# Patient Record
Sex: FEMALE | Race: WHITE | URBAN - METROPOLITAN AREA
[De-identification: names, ages, dates, MRNs, and addresses within clinical notes are randomized per-mention and may not be internally consistent; named-entity substitution may affect disease eponyms.]

---

## 2024-01-18 ENCOUNTER — OFFICE VISIT (OUTPATIENT)
Dept: OBGYN CLINIC | Facility: CLINIC | Age: 19
End: 2024-01-18

## 2024-01-18 VITALS — WEIGHT: 229 LBS | SYSTOLIC BLOOD PRESSURE: 126 MMHG | HEART RATE: 77 BPM | DIASTOLIC BLOOD PRESSURE: 58 MMHG

## 2024-01-18 DIAGNOSIS — Z30.09 UNWANTED FERTILITY: Primary | ICD-10-CM

## 2024-01-18 PROCEDURE — 99203 OFFICE O/P NEW LOW 30 MIN: CPT | Performed by: NURSE PRACTITIONER

## 2024-01-18 RX ORDER — NORGESTIMATE AND ETHINYL ESTRADIOL 0.25-0.035
1 KIT ORAL DAILY
Qty: 28 TABLET | Refills: 3 | Status: SHIPPED | OUTPATIENT
Start: 2024-01-18

## 2024-01-18 NOTE — PATIENT INSTRUCTIONS
Thank you for your confidence in our team.   We appreciate you and welcome your feedback.   If you receive a survey from us, please take a few moments to let us know how we are doing.   Sincerely,  HUSSAIN Frost

## 2024-01-18 NOTE — PROGRESS NOTES
PROBLEM GYNECOLOGICAL VISIT    Renae Simental is a 18 y.o. female who presents today with complaint of unwanted fertility.  Her general medical history has been reviewed and she reports it as follows:    Past Medical History:   Diagnosis Date    No known health problems      Past Surgical History:   Procedure Laterality Date    NO PAST SURGERIES       OB History          0    Para   0    Term   0       0    AB   0    Living   0         SAB   0    IAB   0    Ectopic   0    Multiple   0    Live Births   0               Social History     Tobacco Use    Smoking status: Never    Smokeless tobacco: Never   Vaping Use    Vaping status: Never Used   Substance Use Topics    Alcohol use: Yes     Comment: couple times/year    Drug use: Never     Social History     Substance and Sexual Activity   Sexual Activity Yes    Partners: Male    Birth control/protection: Condom Male       No current outpatient medications    History of Present Illness:   Reports she is sexually active and consistently using condoms.  Desires to initiate contraception with OCP's.  She has never used contraception other than condoms previously.  Reports menses are regular and without issue.  Menses started yesterday.    Review of Systems:  Review of Systems   Constitutional: Negative.    Gastrointestinal: Negative.    Genitourinary: Negative.        Physical Exam:  /58   Pulse 77   Wt 104 kg (229 lb)   LMP 2024 (Exact Date)   Physical Exam  Constitutional:       General: She is not in acute distress.  Neurological:      Mental Status: She is alert.   Skin:     General: Skin is warm and dry.   Vitals reviewed.       Assessment:   1. Unwanted fertility.    Plan:   1. Given Rx OCP's and instructed on appropriate administration.   2. Return to office in 3 months for surveillance visit.   3. Patient's depression screening was assessed with a PHQ-2 score of 0. Their PHQ-9 score was 0. Clinically patient does not have  depression. No treatment is required.

## 2024-02-17 ENCOUNTER — APPOINTMENT (EMERGENCY)
Dept: ULTRASOUND IMAGING | Facility: HOSPITAL | Age: 19
End: 2024-02-17

## 2024-02-17 ENCOUNTER — HOSPITAL ENCOUNTER (EMERGENCY)
Facility: HOSPITAL | Age: 19
Discharge: HOME | End: 2024-02-18
Attending: EMERGENCY MEDICINE

## 2024-02-17 DIAGNOSIS — Z34.90 PREGNANCY: Primary | ICD-10-CM

## 2024-02-17 LAB
ALBUMIN SERPL BCP-MCNC: 4.1 G/DL (ref 3.5–5)
ALP SERPL-CCNC: 38 U/L (ref 34–104)
ALT SERPL W P-5'-P-CCNC: 16 U/L (ref 7–52)
ANION GAP SERPL CALCULATED.3IONS-SCNC: 10 MMOL/L
AST SERPL W P-5'-P-CCNC: 14 U/L (ref 13–39)
B-HCG SERPL-ACNC: 667 MIU/ML (ref 0–5)
BASOPHILS # BLD AUTO: 0.04 THOUSANDS/ÂΜL (ref 0–0.1)
BASOPHILS NFR BLD AUTO: 1 % (ref 0–1)
BILIRUB SERPL-MCNC: 0.56 MG/DL (ref 0.2–1)
BUN SERPL-MCNC: 13 MG/DL (ref 5–25)
CALCIUM SERPL-MCNC: 8.8 MG/DL (ref 8.4–10.2)
CHLORIDE SERPL-SCNC: 106 MMOL/L (ref 96–108)
CO2 SERPL-SCNC: 20 MMOL/L (ref 21–32)
CREAT SERPL-MCNC: 0.69 MG/DL (ref 0.6–1.3)
EOSINOPHIL # BLD AUTO: 0.08 THOUSAND/ÂΜL (ref 0–0.61)
EOSINOPHIL NFR BLD AUTO: 1 % (ref 0–6)
ERYTHROCYTE [DISTWIDTH] IN BLOOD BY AUTOMATED COUNT: 19.6 % (ref 11.6–15.1)
GFR SERPL CREATININE-BSD FRML MDRD: 125 ML/MIN/1.73SQ M
GLUCOSE SERPL-MCNC: 94 MG/DL (ref 65–140)
HCT VFR BLD AUTO: 32.6 % (ref 34.8–46.1)
HGB BLD-MCNC: 9.5 G/DL (ref 11.5–15.4)
IMM GRANULOCYTES # BLD AUTO: 0.03 THOUSAND/UL (ref 0–0.2)
IMM GRANULOCYTES NFR BLD AUTO: 0 % (ref 0–2)
LYMPHOCYTES # BLD AUTO: 2.7 THOUSANDS/ÂΜL (ref 0.6–4.47)
LYMPHOCYTES NFR BLD AUTO: 32 % (ref 14–44)
MCH RBC QN AUTO: 21 PG (ref 26.8–34.3)
MCHC RBC AUTO-ENTMCNC: 29.1 G/DL (ref 31.4–37.4)
MCV RBC AUTO: 72 FL (ref 82–98)
MONOCYTES # BLD AUTO: 0.4 THOUSAND/ÂΜL (ref 0.17–1.22)
MONOCYTES NFR BLD AUTO: 5 % (ref 4–12)
NEUTROPHILS # BLD AUTO: 5.28 THOUSANDS/ÂΜL (ref 1.85–7.62)
NEUTS SEG NFR BLD AUTO: 61 % (ref 43–75)
NRBC BLD AUTO-RTO: 0 /100 WBCS
PLATELET # BLD AUTO: 264 THOUSANDS/UL (ref 149–390)
PMV BLD AUTO: 11.2 FL (ref 8.9–12.7)
POTASSIUM SERPL-SCNC: 3.8 MMOL/L (ref 3.5–5.3)
PROT SERPL-MCNC: 7.4 G/DL (ref 6.4–8.4)
RBC # BLD AUTO: 4.52 MILLION/UL (ref 3.81–5.12)
SODIUM SERPL-SCNC: 136 MMOL/L (ref 135–147)
WBC # BLD AUTO: 8.53 THOUSAND/UL (ref 4.31–10.16)

## 2024-02-17 PROCEDURE — 80053 COMPREHEN METABOLIC PANEL: CPT | Performed by: EMERGENCY MEDICINE

## 2024-02-17 PROCEDURE — 99284 EMERGENCY DEPT VISIT MOD MDM: CPT

## 2024-02-17 PROCEDURE — 76815 OB US LIMITED FETUS(S): CPT

## 2024-02-17 PROCEDURE — 86901 BLOOD TYPING SEROLOGIC RH(D): CPT | Performed by: EMERGENCY MEDICINE

## 2024-02-17 PROCEDURE — 96361 HYDRATE IV INFUSION ADD-ON: CPT

## 2024-02-17 PROCEDURE — 96360 HYDRATION IV INFUSION INIT: CPT

## 2024-02-17 PROCEDURE — 36415 COLL VENOUS BLD VENIPUNCTURE: CPT | Performed by: EMERGENCY MEDICINE

## 2024-02-17 PROCEDURE — 86900 BLOOD TYPING SEROLOGIC ABO: CPT | Performed by: EMERGENCY MEDICINE

## 2024-02-17 PROCEDURE — 84702 CHORIONIC GONADOTROPIN TEST: CPT | Performed by: EMERGENCY MEDICINE

## 2024-02-17 PROCEDURE — 85025 COMPLETE CBC W/AUTO DIFF WBC: CPT | Performed by: EMERGENCY MEDICINE

## 2024-02-17 PROCEDURE — 99284 EMERGENCY DEPT VISIT MOD MDM: CPT | Performed by: EMERGENCY MEDICINE

## 2024-02-17 RX ORDER — ACETAMINOPHEN 325 MG/1
975 TABLET ORAL ONCE
Status: COMPLETED | OUTPATIENT
Start: 2024-02-17 | End: 2024-02-17

## 2024-02-17 RX ADMIN — SODIUM CHLORIDE 1000 ML: 0.9 INJECTION, SOLUTION INTRAVENOUS at 22:41

## 2024-02-17 RX ADMIN — ACETAMINOPHEN 975 MG: 325 TABLET, FILM COATED ORAL at 22:41

## 2024-02-18 VITALS
RESPIRATION RATE: 16 BRPM | TEMPERATURE: 98.6 F | OXYGEN SATURATION: 100 % | DIASTOLIC BLOOD PRESSURE: 55 MMHG | SYSTOLIC BLOOD PRESSURE: 117 MMHG | HEART RATE: 103 BPM

## 2024-02-18 LAB
ABO GROUP BLD: NORMAL
BILIRUB UR QL STRIP: NEGATIVE
CLARITY UR: CLEAR
COLOR UR: ABNORMAL
GLUCOSE UR STRIP-MCNC: NEGATIVE MG/DL
HGB UR QL STRIP.AUTO: NEGATIVE
KETONES UR STRIP-MCNC: ABNORMAL MG/DL
LEUKOCYTE ESTERASE UR QL STRIP: NEGATIVE
NITRITE UR QL STRIP: NEGATIVE
PH UR STRIP.AUTO: 5.5 [PH]
PROT UR STRIP-MCNC: NEGATIVE MG/DL
RH BLD: POSITIVE
SP GR UR STRIP.AUTO: 1.02 (ref 1–1.03)
UROBILINOGEN UR STRIP-ACNC: <2 MG/DL

## 2024-02-18 PROCEDURE — 87086 URINE CULTURE/COLONY COUNT: CPT | Performed by: EMERGENCY MEDICINE

## 2024-02-18 PROCEDURE — 81003 URINALYSIS AUTO W/O SCOPE: CPT | Performed by: EMERGENCY MEDICINE

## 2024-02-18 RX ORDER — PRENATAL VIT/IRON FUM/FOLIC AC 27MG-0.8MG
1 TABLET ORAL DAILY
Qty: 14 TABLET | Refills: 0 | Status: SHIPPED | OUTPATIENT
Start: 2024-02-18 | End: 2024-03-03

## 2024-02-18 NOTE — DISCHARGE INSTRUCTIONS
Your hCG (pregnancy hormone) is 667.  An ultrasound was performed today however was unable to identify an intrauterine pregnancy.  Given your recent menstrual period as well as your pregnancy hormone it is likely that your pregnancy is very early.  An ambulatory referral to obstetrics has been placed for you.  Please follow-up for repeat blood work as well as a confirmatory pregnancy.  Please return to the emergency department if you develop any fever, chills, worsening abdominal pain despite Tylenol, vaginal bleeding, or for any other concerns.

## 2024-02-18 NOTE — ED PROVIDER NOTES
History  Chief Complaint   Patient presents with    Abdominal Pain     Possible pregnancy- pt reports possible positive pregnancy test yesterday at home. Lower abdominal pain that started today. Denies n/v/d.      (Renae Simental) Renae Simental is a 18 y.o. female  at Unknown    They presented to the emergency department on 2024 for the chief complaint of: Patient presents with:  Abdominal Pain: Possible pregnancy- pt reports possible positive pregnancy test yesterday at home. Lower abdominal pain that started today. Denies n/v/d.       The patient states that she last had her menstrual period on  of this year, and was told by her boyfriend that she needed to take a pregnancy test yesterday because she was due and it was noted to be positive.  Patient has also been experiencing mild left lower quadrant abdominal discomfort.  Patient denies any nausea, vomiting, diarrhea, vaginal bleeding.  Patient states that she possibly has a history of kidney stones, however is unable to recall definitive diagnosis. Patient denies fever, chills, back pain, or any other complaint at this time.                Prior to Admission Medications   Prescriptions Last Dose Informant Patient Reported? Taking?   norgestimate-ethinyl estradiol (Sprintec 28) 0.25-35 MG-MCG per tablet   No No   Sig: Take 1 tablet by mouth daily      Facility-Administered Medications: None       Past Medical History:   Diagnosis Date    No known health problems        Past Surgical History:   Procedure Laterality Date    NO PAST SURGERIES         Family History   Problem Relation Age of Onset    Breast cancer Neg Hx     Colon cancer Neg Hx     Ovarian cancer Neg Hx     Cancer Neg Hx      I have reviewed and agree with the history as documented.    E-Cigarette/Vaping    E-Cigarette Use Never User      E-Cigarette/Vaping Substances     Social History     Tobacco Use    Smoking status: Never    Smokeless tobacco: Never    Vaping Use    Vaping status: Never Used   Substance Use Topics    Alcohol use: Yes     Comment: couple times/year    Drug use: Never        Review of Systems   Constitutional:  Negative for chills and fever.   HENT:  Negative for ear pain and sore throat.    Eyes:  Negative for pain and visual disturbance.   Respiratory:  Negative for cough and shortness of breath.    Cardiovascular:  Negative for chest pain and palpitations.   Gastrointestinal:  Positive for abdominal pain. Negative for constipation, diarrhea, nausea and vomiting.   Genitourinary:  Negative for dysuria and hematuria.   Musculoskeletal:  Negative for arthralgias and back pain.   Skin:  Negative for color change and rash.   Neurological:  Negative for seizures and syncope.   All other systems reviewed and are negative.      Physical Exam  ED Triage Vitals   Temperature Pulse Respirations Blood Pressure SpO2   02/17/24 2147 02/17/24 2147 02/17/24 2147 02/17/24 2147 02/17/24 2147   98.6 °F (37 °C) 102 16 127/79 100 %      Temp Source Heart Rate Source Patient Position - Orthostatic VS BP Location FiO2 (%)   02/17/24 2147 02/17/24 2147 02/17/24 2147 02/17/24 2147 --   Oral Monitor Sitting Right arm       Pain Score       02/17/24 2241       6             Orthostatic Vital Signs  Vitals:    02/17/24 2147 02/18/24 0120   BP: 127/79 117/55   Pulse: 102 103   Patient Position - Orthostatic VS: Sitting Standing       Physical Exam  Vitals and nursing note reviewed.   Constitutional:       General: She is not in acute distress.     Appearance: Normal appearance.   HENT:      Head: Normocephalic and atraumatic.      Right Ear: External ear normal.      Left Ear: External ear normal.      Nose: Nose normal.      Mouth/Throat:      Mouth: Mucous membranes are moist.   Eyes:      Conjunctiva/sclera: Conjunctivae normal.   Cardiovascular:      Rate and Rhythm: Normal rate and regular rhythm.   Pulmonary:      Effort: Pulmonary effort is normal. No respiratory  distress.      Breath sounds: Normal breath sounds.   Abdominal:      General: Abdomen is flat. Bowel sounds are normal.      Tenderness: There is no abdominal tenderness. There is no guarding or rebound.   Musculoskeletal:         General: Normal range of motion.      Cervical back: Normal range of motion.   Skin:     General: Skin is warm and dry.      Capillary Refill: Capillary refill takes less than 2 seconds.   Neurological:      Mental Status: She is alert. Mental status is at baseline.   Psychiatric:         Mood and Affect: Mood normal.         ED Medications  Medications   acetaminophen (TYLENOL) tablet 975 mg (975 mg Oral Given 2/17/24 2241)   sodium chloride 0.9 % bolus 1,000 mL (0 mL Intravenous Stopped 2/18/24 0110)       Diagnostic Studies  Results Reviewed       Procedure Component Value Units Date/Time    UA w Reflex to Microscopic w Reflex to Culture [[id number]]  (Abnormal) Collected: 02/18/24 0134    Lab Status: Final result Specimen: Urine, Clean Catch Updated: 02/18/24 0154     Color, UA Light Yellow     Clarity, UA Clear     Specific Gravity, UA 1.025     pH, UA 5.5     Leukocytes, UA Negative     Nitrite, UA Negative     Protein, UA Negative mg/dl      Glucose, UA Negative mg/dl      Ketones, UA Trace mg/dl      Urobilinogen, UA <2.0 mg/dl      Bilirubin, UA Negative     Occult Blood, UA Negative     URINE COMMENT --    Urine culture [[id number]] Collected: 02/18/24 0134    Lab Status: In process Specimen: Urine, Clean Catch Updated: 02/18/24 0154    hCG, quantitative [[id number]]  (Abnormal) Collected: 02/17/24 2239    Lab Status: Final result Specimen: Blood from Arm, Right Updated: 02/17/24 2316     HCG, Quant 667 mIU/mL     Narrative:       Expected Ranges:    HCG results between 5 and 25 mIU/mL may be indicative of early pregnancy but should be interpreted in light of the total clinical presentation.    HCG can rise to detectable levels in antione and post menopausal women (0-11.6 mIU/mL).      Approximate               Approximate HCG  Gestation age          Concentration ( mIU/mL)  _____________          ______________________   Weeks                      HCG values  0.2-1                       5-50  1-2                           2-3                         100-5000  3-4                         500-65532  4-5                         1000-39494  5-6                         [id number]  6-8                         72869-628294  8-12                        [id number]      Comprehensive metabolic panel [[id number]]  (Abnormal) Collected: 02/17/24 2239    Lab Status: Final result Specimen: Blood from Arm, Right Updated: 02/17/24 2309     Sodium 136 mmol/L      Potassium 3.8 mmol/L      Chloride 106 mmol/L      CO2 20 mmol/L      ANION GAP 10 mmol/L      BUN 13 mg/dL      Creatinine 0.69 mg/dL      Glucose 94 mg/dL      Calcium 8.8 mg/dL      AST 14 U/L      ALT 16 U/L      Alkaline Phosphatase 38 U/L      Total Protein 7.4 g/dL      Albumin 4.1 g/dL      Total Bilirubin 0.56 mg/dL      eGFR 125 ml/min/1.73sq m     Narrative:      National Kidney Disease Foundation guidelines for Chronic Kidney Disease (CKD):     Stage 1 with normal or high GFR (GFR > 90 mL/min/1.73 square meters)    Stage 2 Mild CKD (GFR = 60-89 mL/min/1.73 square meters)    Stage 3A Moderate CKD (GFR = 45-59 mL/min/1.73 square meters)    Stage 3B Moderate CKD (GFR = 30-44 mL/min/1.73 square meters)    Stage 4 Severe CKD (GFR = 15-29 mL/min/1.73 square meters)    Stage 5 End Stage CKD (GFR <15 mL/min/1.73 square meters)  Note: GFR calculation is accurate only with a steady state creatinine    CBC and differential [[id number]]  (Abnormal) Collected: 02/17/24 2239    Lab Status: Final result Specimen: Blood from Arm, Right Updated: 02/17/24 2258     WBC 8.53 Thousand/uL      RBC 4.52 Million/uL      Hemoglobin 9.5 g/dL      Hematocrit 32.6 %      MCV 72 fL      MCH 21.0 pg      MCHC 29.1 g/dL      RDW 19.6 %      MPV 11.2 fL       Platelets 264 Thousands/uL      nRBC 0 /100 WBCs      Neutrophils Relative 61 %      Immat GRANS % 0 %      Lymphocytes Relative 32 %      Monocytes Relative 5 %      Eosinophils Relative 1 %      Basophils Relative 1 %      Neutrophils Absolute 5.28 Thousands/µL      Immature Grans Absolute 0.03 Thousand/uL      Lymphocytes Absolute 2.70 Thousands/µL      Monocytes Absolute 0.40 Thousand/µL      Eosinophils Absolute 0.08 Thousand/µL      Basophils Absolute 0.04 Thousands/µL                    US OB pregnancy limited with transvaginal   Final Result by Jonn Fenton DO (50)      No intrauterine gestation is seen.      Differential considerations remain early IUP, spontaneous  and/or ectopic pregnancy. Clinical correlation and correlation with serial quantitative BHCG measurements recommended.      Probable right ovarian corpus luteum, bilateral ovaries otherwise appear grossly unremarkable.      Other findings as above.            Workstation performed: [id number]               Procedures  Procedures      ED Course  ED Course as of 24 0241   Sat 2024   2300 Hemoglobin(!): 9.5  No previous to compare to   2316 HCG QUANTITATIVE(!): 667  Discussed with patient.  Will obtain ultrasound to rule out ectopic.                                       Medical Decision Making  18-year-old female last menstrual period on 2024 now with complaints of mild left lower quadrant pain without tenderness on examination.  Differential diagnosis includes pregnancy, ectopic, nephrolithiasis.  Will obtain blood work at this time to evaluate for quantitative hCG, Rh status, should it be positive will obtain ultrasound to rule out possible ectopic.  Will also obtain urinalysis to evaluate for urinary tract infection, as well as evidence of hematuria for possible evaluation of nephrolithiasis.  Will provide IV fluid as well as Tylenol for symptomatic control.     Laboratory analysis showed positive  hCG of 667.  Ultrasound ordered which did not show IUP, but no other signs of ectopic.  Urinalysis showing mild ketones without signs of infection.  Patient feeling much improved after administration of IV fluids as well as Tylenol.  Discussed these findings with patient.  Ambulatory referral to obstetrics placed.  Prescription for prenatal vitamins provided.  Discussed that patient would need repeat blood work as well as confirmatory ultrasound.  Strict return precautions discussed. Patient appears well, nontoxic, agrees with plan of care at this time.  Answered all questions.  In light of this, patient would benefit from outpatient follow-up.    Amount and/or Complexity of Data Reviewed  Labs: ordered. Decision-making details documented in ED Course.  Radiology: ordered.    Risk  OTC drugs.          Disposition  Final diagnoses:   Pregnancy     Time reflects when diagnosis was documented in both MDM as applicable and the Disposition within this note       Time User Action Codes Description Comment    2/18/2024 12:58 AM Eleazar Medeiros Add [Z34.90] Pregnancy           ED Disposition       ED Disposition   Discharge    Condition   Stable    Date/Time   Sun Feb 18, 2024 12:59 AM    Comment   Renae Simental discharge to home/self care.                   Follow-up Information    None         Discharge Medication List as of 2/18/2024  1:01 AM        START taking these medications    Details   prenatal vitamin (CLASSIC FORMULA) 27-0.8 mg Take 1 tablet by mouth in the morning for 14 days, Starting Sun 2/18/2024, Until Sun 3/3/2024, Normal           CONTINUE these medications which have NOT CHANGED    Details   norgestimate-ethinyl estradiol (Sprintec 28) 0.25-35 MG-MCG per tablet Take 1 tablet by mouth daily, Starting Thu 1/18/2024, Normal               PDMP Review       None             ED Provider  Attending physically available and evaluated Renae Simental. I managed the patient along with the ED  Attending.    Electronically Signed by           Eleazar Medeiros MD  02/18/24 9286

## 2024-02-18 NOTE — ED ATTENDING ATTESTATION
2/17/2024  IMaren DO, saw and evaluated the patient. I have discussed the patient with the resident/non-physician practitioner and agree with the resident's/non-physician practitioner's findings, Plan of Care, and MDM as documented in the resident's/non-physician practitioner's note, except where noted. All available labs and Radiology studies were reviewed.  I was present for key portions of any procedure(s) performed by the resident/non-physician practitioner and I was immediately available to provide assistance.       At this point I agree with the current assessment done in the Emergency Department.  I have conducted an independent evaluation of this patient a history and physical is as follows:    History  Patient is a 18 y.o. year old female who presents for evaluation with abdominal pain.  Patient reports left lower quadrant abdominal pain which started earlier today.  Patient states that her last menstrual period was in mid January, and she recently took a home pregnancy test which was positive.  She denies any associated nausea, vomiting, diarrhea, vaginal bleeding, dysuria, or other concerning symptoms.    Current Outpatient Medications   Medication Instructions    norgestimate-ethinyl estradiol (Sprintec 28) 0.25-35 MG-MCG per tablet 1 tablet, Oral, Daily    prenatal vitamin (CLASSIC FORMULA) 27-0.8 mg 1 tablet, Oral, Daily     Past Medical History:   Diagnosis Date    No known health problems      Past Surgical History:   Procedure Laterality Date    NO PAST SURGERIES         Objective  Vitals:    02/17/24 2147 02/18/24 0120   BP: 127/79 117/55   BP Location: Right arm Right arm   Pulse: 102 103   Resp: 16 16   Temp: 98.6 °F (37 °C)    TempSrc: Oral    SpO2: 100% 100%       General: VSS, NAD, awake, alert.    Head: Normocephalic, atraumatic.  Eyes: PERRL, EOM-I.   ENT: Atraumatic external nose and ears.  MMM. No stridor.   Neck: Symmetric, supple, trachea midline.  CV: RRR.    Lungs:  Respirations unlabored, no tachypnea.    Abd: soft, ND. Mild LLQ abdominal tenderness without guarding. No peritoneal signs.   MSK: Extremities without tenderness or gross deformity. No lower extremity edema.   Skin: Dry, intact. No lesions.  Neuro: AAOx3, GCS 15, CN II-XII grossly intact. Motor grossly intact. Sensory grossly intact.  Psychiatric/Behavioral: Appropriate mood and affect. Behavior normal.    Results Reviewed       Procedure Component Value Units Date/Time    UA w Reflex to Microscopic w Reflex to Culture [[id number]]  (Abnormal) Collected: 02/18/24 0134    Lab Status: Final result Specimen: Urine, Clean Catch Updated: 02/18/24 0154     Color, UA Light Yellow     Clarity, UA Clear     Specific Gravity, UA 1.025     pH, UA 5.5     Leukocytes, UA Negative     Nitrite, UA Negative     Protein, UA Negative mg/dl      Glucose, UA Negative mg/dl      Ketones, UA Trace mg/dl      Urobilinogen, UA <2.0 mg/dl      Bilirubin, UA Negative     Occult Blood, UA Negative     URINE COMMENT --    Urine culture [[id number]] Collected: 02/18/24 0134    Lab Status: In process Specimen: Urine, Clean Catch Updated: 02/18/24 0154    hCG, quantitative [[id number]]  (Abnormal) Collected: 02/17/24 2239    Lab Status: Final result Specimen: Blood from Arm, Right Updated: 02/17/24 2316     HCG, Quant 667 mIU/mL     Narrative:       Expected Ranges:    HCG results between 5 and 25 mIU/mL may be indicative of early pregnancy but should be interpreted in light of the total clinical presentation.    HCG can rise to detectable levels in antione and post menopausal women (0-11.6 mIU/mL).     Approximate               Approximate HCG  Gestation age          Concentration ( mIU/mL)  _____________          ______________________   Weeks                      HCG values  0.2-1                       5-50  1-2                           2-3                         100-5000  3-4                         500-57333  4-5                          1000-05054  5-6                         72166-534845  6-8                         94884-105750  8-12                        [id number]      Comprehensive metabolic panel [[id number]]  (Abnormal) Collected: 02/17/24 2239    Lab Status: Final result Specimen: Blood from Arm, Right Updated: 02/17/24 2309     Sodium 136 mmol/L      Potassium 3.8 mmol/L      Chloride 106 mmol/L      CO2 20 mmol/L      ANION GAP 10 mmol/L      BUN 13 mg/dL      Creatinine 0.69 mg/dL      Glucose 94 mg/dL      Calcium 8.8 mg/dL      AST 14 U/L      ALT 16 U/L      Alkaline Phosphatase 38 U/L      Total Protein 7.4 g/dL      Albumin 4.1 g/dL      Total Bilirubin 0.56 mg/dL      eGFR 125 ml/min/1.73sq m     Narrative:      National Kidney Disease Foundation guidelines for Chronic Kidney Disease (CKD):     Stage 1 with normal or high GFR (GFR > 90 mL/min/1.73 square meters)    Stage 2 Mild CKD (GFR = 60-89 mL/min/1.73 square meters)    Stage 3A Moderate CKD (GFR = 45-59 mL/min/1.73 square meters)    Stage 3B Moderate CKD (GFR = 30-44 mL/min/1.73 square meters)    Stage 4 Severe CKD (GFR = 15-29 mL/min/1.73 square meters)    Stage 5 End Stage CKD (GFR <15 mL/min/1.73 square meters)  Note: GFR calculation is accurate only with a steady state creatinine    CBC and differential [[id number]]  (Abnormal) Collected: 02/17/24 2239    Lab Status: Final result Specimen: Blood from Arm, Right Updated: 02/17/24 2258     WBC 8.53 Thousand/uL      RBC 4.52 Million/uL      Hemoglobin 9.5 g/dL      Hematocrit 32.6 %      MCV 72 fL      MCH 21.0 pg      MCHC 29.1 g/dL      RDW 19.6 %      MPV 11.2 fL      Platelets 264 Thousands/uL      nRBC 0 /100 WBCs      Neutrophils Relative 61 %      Immat GRANS % 0 %      Lymphocytes Relative 32 %      Monocytes Relative 5 %      Eosinophils Relative 1 %      Basophils Relative 1 %      Neutrophils Absolute 5.28 Thousands/µL      Immature Grans Absolute 0.03 Thousand/uL      Lymphocytes Absolute 2.70 Thousands/µL       Monocytes Absolute 0.40 Thousand/µL      Eosinophils Absolute 0.08 Thousand/µL      Basophils Absolute 0.04 Thousands/µL           US OB pregnancy limited with transvaginal   Final Result by Jonn Fenton DO (50)      No intrauterine gestation is seen.      Differential considerations remain early IUP, spontaneous  and/or ectopic pregnancy. Clinical correlation and correlation with serial quantitative BHCG measurements recommended.      Probable right ovarian corpus luteum, bilateral ovaries otherwise appear grossly unremarkable.      Other findings as above.            Workstation performed: [id number]           Medications   acetaminophen (TYLENOL) tablet 975 mg (975 mg Oral Given 24 2241)   sodium chloride 0.9 % bolus 1,000 mL (0 mL Intravenous Stopped 24 0110)     ED Course as of 24 0500   Sat 2024   2319 HCG QUANTITATIVE(!): 667       MDM  18-year-old female presents for evaluation with left lower quadrant abdominal pain.  Patient reports a recent positive home pregnancy test.  On exam, patient with normal vitals, no acute distress.  Mild left lower quadrant abdominal tenderness without rebound or guarding.  Differential diagnosis includes, but is not limited to, pregnancy related abdominal pain, ectopic pregnancy, ovarian cyst or torsion, urinary tract infection, or other acute pathology.  Patient evaluated with quantitative hCG level, type and screen, CBC, CMP, and UA.    Patient's blood type noted to be A positive, no RhoGAM required at this time.  Patient's hCG level noted to be mildly elevated at 667.  CBC notable for a mild anemia with a hemoglobin 9.5, lab work otherwise unremarkable.  UA negative for signs of infection.  Pelvic ultrasound ordered at this time to evaluate for possible ectopic pregnancy, ruptured ectopic, or acute ovarian pathology.  No intrauterine gestation was seen on ultrasound, but there is no findings concerning for a ruptured  ectopic pregnancy or acute ovarian pathology at this time.  Patient was instructed to follow-up with OB for further evaluation and for confirmation of an intrauterine pregnancy.  Patient discharged home in stable condition with symptomatic care instructions and strict ED return precautions.    Final Diagnosis:  1. Pregnancy        Critical Care Time  Procedures

## 2024-02-19 LAB — BACTERIA UR CULT: NORMAL

## 2024-02-26 ENCOUNTER — APPOINTMENT (EMERGENCY)
Dept: ULTRASOUND IMAGING | Facility: HOSPITAL | Age: 19
End: 2024-02-26

## 2024-02-26 ENCOUNTER — HOSPITAL ENCOUNTER (EMERGENCY)
Facility: HOSPITAL | Age: 19
Discharge: HOME | End: 2024-02-26
Attending: EMERGENCY MEDICINE

## 2024-02-26 VITALS
WEIGHT: 239.42 LBS | RESPIRATION RATE: 18 BRPM | OXYGEN SATURATION: 100 % | SYSTOLIC BLOOD PRESSURE: 137 MMHG | HEART RATE: 103 BPM | TEMPERATURE: 98.5 F | DIASTOLIC BLOOD PRESSURE: 67 MMHG

## 2024-02-26 DIAGNOSIS — O20.0 THREATENED MISCARRIAGE: Primary | ICD-10-CM

## 2024-02-26 LAB
B-HCG SERPL-ACNC: 718 MIU/ML (ref 0–5)
BASOPHILS # BLD AUTO: 0.03 THOUSANDS/ÂΜL (ref 0–0.1)
BASOPHILS NFR BLD AUTO: 0 % (ref 0–1)
EOSINOPHIL # BLD AUTO: 0.07 THOUSAND/ÂΜL (ref 0–0.61)
EOSINOPHIL NFR BLD AUTO: 1 % (ref 0–6)
ERYTHROCYTE [DISTWIDTH] IN BLOOD BY AUTOMATED COUNT: 20.4 % (ref 11.6–15.1)
HCT VFR BLD AUTO: 34.7 % (ref 34.8–46.1)
HGB BLD-MCNC: 10.2 G/DL (ref 11.5–15.4)
IMM GRANULOCYTES # BLD AUTO: 0.02 THOUSAND/UL (ref 0–0.2)
IMM GRANULOCYTES NFR BLD AUTO: 0 % (ref 0–2)
LYMPHOCYTES # BLD AUTO: 2.08 THOUSANDS/ÂΜL (ref 0.6–4.47)
LYMPHOCYTES NFR BLD AUTO: 26 % (ref 14–44)
MCH RBC QN AUTO: 21.5 PG (ref 26.8–34.3)
MCHC RBC AUTO-ENTMCNC: 29.4 G/DL (ref 31.4–37.4)
MCV RBC AUTO: 73 FL (ref 82–98)
MONOCYTES # BLD AUTO: 0.39 THOUSAND/ÂΜL (ref 0.17–1.22)
MONOCYTES NFR BLD AUTO: 5 % (ref 4–12)
NEUTROPHILS # BLD AUTO: 5.56 THOUSANDS/ÂΜL (ref 1.85–7.62)
NEUTS SEG NFR BLD AUTO: 68 % (ref 43–75)
NRBC BLD AUTO-RTO: 0 /100 WBCS
PLATELET # BLD AUTO: 258 THOUSANDS/UL (ref 149–390)
PMV BLD AUTO: 11.2 FL (ref 8.9–12.7)
RBC # BLD AUTO: 4.75 MILLION/UL (ref 3.81–5.12)
WBC # BLD AUTO: 8.15 THOUSAND/UL (ref 4.31–10.16)

## 2024-02-26 PROCEDURE — 85025 COMPLETE CBC W/AUTO DIFF WBC: CPT

## 2024-02-26 PROCEDURE — 84702 CHORIONIC GONADOTROPIN TEST: CPT

## 2024-02-26 PROCEDURE — 99284 EMERGENCY DEPT VISIT MOD MDM: CPT

## 2024-02-26 PROCEDURE — 76815 OB US LIMITED FETUS(S): CPT

## 2024-02-26 PROCEDURE — 99284 EMERGENCY DEPT VISIT MOD MDM: CPT | Performed by: EMERGENCY MEDICINE

## 2024-02-26 PROCEDURE — 36415 COLL VENOUS BLD VENIPUNCTURE: CPT

## 2024-02-26 RX ORDER — ACETAMINOPHEN 325 MG/1
650 TABLET ORAL ONCE
Status: COMPLETED | OUTPATIENT
Start: 2024-02-26 | End: 2024-02-26

## 2024-02-26 RX ADMIN — ACETAMINOPHEN 650 MG: 325 TABLET, FILM COATED ORAL at 10:11

## 2024-02-26 NOTE — ED PROVIDER NOTES
History  Chief Complaint   Patient presents with    Vaginal Bleeding - Pregnant     Reports one episode of bright red blood this morning; also c/o L lower abd pain that started during the night. Pt is early in pregnancy. LMP 24     HPI Pt is a 17 y/o pregnant female a0 presenting to the ED with left adnexa/suprapubic cramping pain that is constant for about a week. Was seen  for abdominal pain, today she came because she had one instant of spotting/bleeding, scant but concerning as she has not had spotting prior. LNMP 24. Pain is unchanged, feels more prominent at night when trying to sleep. Was taking OCPs prior to positive test, had just started medication a few weeks prior. Currently only taking prenatal medication.     Prior to Admission Medications   Prescriptions Last Dose Informant Patient Reported? Taking?   norgestimate-ethinyl estradiol (Sprintec 28) 0.25-35 MG-MCG per tablet   No No   Sig: Take 1 tablet by mouth daily   prenatal vitamin (CLASSIC FORMULA) 27-0.8 mg   No No   Sig: Take 1 tablet by mouth in the morning for 14 days      Facility-Administered Medications: None       Past Medical History:   Diagnosis Date    No known health problems        Past Surgical History:   Procedure Laterality Date    NO PAST SURGERIES         Family History   Problem Relation Age of Onset    Breast cancer Neg Hx     Colon cancer Neg Hx     Ovarian cancer Neg Hx     Cancer Neg Hx      I have reviewed and agree with the history as documented.    E-Cigarette/Vaping    E-Cigarette Use Never User      E-Cigarette/Vaping Substances     Social History     Tobacco Use    Smoking status: Never    Smokeless tobacco: Never   Vaping Use    Vaping status: Never Used   Substance Use Topics    Alcohol use: Yes     Comment: couple times/year    Drug use: Never        Review of Systems   Gastrointestinal:  Positive for abdominal pain.   Genitourinary:  Positive for vaginal bleeding.   All other systems reviewed and  are negative.      Physical Exam  ED Triage Vitals   Temperature Pulse Respirations Blood Pressure SpO2   02/26/24 0911 02/26/24 0915 02/26/24 0911 02/26/24 0915 02/26/24 0911   98.5 °F (36.9 °C) 103 18 137/67 100 %      Temp Source Heart Rate Source Patient Position - Orthostatic VS BP Location FiO2 (%)   02/26/24 0911 02/26/24 0911 02/26/24 0911 02/26/24 0911 --   Oral Monitor Sitting Right arm       Pain Score       02/26/24 1011       6             Orthostatic Vital Signs  Vitals:    02/26/24 0911 02/26/24 0915   BP:  137/67   Pulse:  103   Patient Position - Orthostatic VS: Sitting        Physical Exam  Vitals and nursing note reviewed.   Constitutional:       General: She is in acute distress (mildly anxious).      Appearance: She is not ill-appearing, toxic-appearing or diaphoretic.   HENT:      Head: Normocephalic and atraumatic.      Nose: Nose normal.      Mouth/Throat:      Mouth: Mucous membranes are moist.      Pharynx: Oropharynx is clear.   Eyes:      Extraocular Movements: Extraocular movements intact.      Conjunctiva/sclera: Conjunctivae normal.      Pupils: Pupils are equal, round, and reactive to light.   Cardiovascular:      Rate and Rhythm: Regular rhythm. Tachycardia present.      Pulses: Normal pulses.      Heart sounds: Normal heart sounds. No murmur heard.     No friction rub. No gallop.   Pulmonary:      Effort: Pulmonary effort is normal. No respiratory distress.      Breath sounds: Normal breath sounds. No stridor. No wheezing, rhonchi or rales.   Chest:      Chest wall: No tenderness.   Abdominal:      General: Bowel sounds are normal. There is no distension.      Palpations: Abdomen is soft. There is no mass.      Tenderness: There is abdominal tenderness (left adnexa, mild). There is no right CVA tenderness, left CVA tenderness, guarding or rebound.      Hernia: No hernia is present.   Musculoskeletal:         General: No swelling, tenderness, deformity or signs of injury. Normal  range of motion.      Cervical back: Normal range of motion and neck supple. No rigidity or tenderness.      Right lower leg: No edema.      Left lower leg: No edema.   Skin:     General: Skin is warm and dry.      Coloration: Skin is not jaundiced or pale.      Findings: No bruising, erythema, lesion or rash.   Neurological:      General: No focal deficit present.      Mental Status: She is alert and oriented to person, place, and time.      Motor: No weakness.   Psychiatric:         Behavior: Behavior normal.         ED Medications  Medications   acetaminophen (TYLENOL) tablet 650 mg (650 mg Oral Given 2/26/24 1011)       Diagnostic Studies  Results Reviewed       Procedure Component Value Units Date/Time    hCG, quantitative [[id number]]  (Abnormal) Collected: 02/26/24 1016    Lab Status: Final result Specimen: Blood from Arm, Right Updated: 02/26/24 1054     HCG, Quant 718 mIU/mL     Narrative:       Expected Ranges:    HCG results between 5 and 25 mIU/mL may be indicative of early pregnancy but should be interpreted in light of the total clinical presentation.    HCG can rise to detectable levels in antione and post menopausal women (0-11.6 mIU/mL).     Approximate               Approximate HCG  Gestation age          Concentration ( mIU/mL)  _____________          ______________________   Weeks                      HCG values  0.2-1                       5-50  1-2                           2-3                         100-5000  3-4                         500-66921  4-5                         1000-88177  5-6                         [id number]  6-8                         [id number]  8-12                        [id number]      CBC and differential [[id number]]  (Abnormal) Collected: 02/26/24 1016    Lab Status: Final result Specimen: Blood from Arm, Right Updated: 02/26/24 1032     WBC 8.15 Thousand/uL      RBC 4.75 Million/uL      Hemoglobin 10.2 g/dL      Hematocrit 34.7 %      MCV 73 fL      MCH 21.5  pg      MCHC 29.4 g/dL      RDW 20.4 %      MPV 11.2 fL      Platelets 258 Thousands/uL      nRBC 0 /100 WBCs      Neutrophils Relative 68 %      Immat GRANS % 0 %      Lymphocytes Relative 26 %      Monocytes Relative 5 %      Eosinophils Relative 1 %      Basophils Relative 0 %      Neutrophils Absolute 5.56 Thousands/µL      Immature Grans Absolute 0.02 Thousand/uL      Lymphocytes Absolute 2.08 Thousands/µL      Monocytes Absolute 0.39 Thousand/µL      Eosinophils Absolute 0.07 Thousand/µL      Basophils Absolute 0.03 Thousands/µL                    US OB pregnancy limited with transvaginal   Final Result by Adrien Dodge MD ( 112)      1. No intrauterine gestation or adnexal mass identified. Slightly thicker and more heterogeneous appearing endometrium with internal vascularity. Differential remains early IUP, spontaneous  and ectopic pregnancy.  Correlate with serial    quantitative BHCG.            Workstation performed: [id number]               Procedures  Procedures      ED Course       Hemodynamically stable, no acute distress.  Pain improved with Tylenol. No identified IUP/adnexal mass identified on formal ultrasound.  Beta-hCG has not doubled in the past week, did increase but not as expected, advised patient to have beta-hCG around 2 days and to follow-up with OB/GYN this week.  Discussed return precautions as well.  She is agreeable to plan and will return as needed.  Dx: threatened miscarriage.                                 Medical Decision Making  Ddx: threatened miscarriage, ovarian torsion, ectopic pregnancy, abdominal pain in pregnancy.    17 y/o pregnant female a0 presenting to the ED with left adnexa/suprapubic cramping pain that is constant for about a week. Was seen  for abdominal pain, today she came because she had one instant of spotting/bleeding, scant but concerning as she has not had spotting prior. LNMP 24. Pain is unchanged, feels more prominent at night  when trying to sleep. Was taking OCPs prior to positive test, had just started medication a few weeks prior. Currently only taking prenatal medication.    Hemodynamically stable, no acute distress.  Pain improved with Tylenol. No identified IUP/adnexal mass identified on formal ultrasound.  Beta-hCG has not doubled in the past week, did increase but not as expected, advised patient to have beta-hCG around 2 days and to follow-up with OB/GYN this week.  Discussed return precautions as well.  She is agreeable to plan and will return as needed.  Dx: threatened miscarriage.       Amount and/or Complexity of Data Reviewed  Labs: ordered.  Radiology: ordered.    Risk  OTC drugs.          Disposition  Final diagnoses:   Threatened miscarriage     Time reflects when diagnosis was documented in both MDM as applicable and the Disposition within this note       Time User Action Codes Description Comment    2/26/2024 11:47 AM Tylor Arnett Add [O20.0] Threatened miscarriage           ED Disposition       ED Disposition   Discharge    Condition   Stable    Date/Time   Mon Feb 26, 2024 11:47 AM    Comment   Renae Simental discharge to home/self care.                   Follow-up Information       Follow up With Specialties Details Why Contact Info Additional Information    [facility] Emergency Medicine   [address] [address]  [phone] [facility], [facility] [place], [place], [address]    [facility] Emergency Medicine  As needed [address] [address]  [phone] [facility], [facility] [place], [place], [address]            Discharge Medication List as of 2/26/2024 11:51 AM        CONTINUE these medications which have NOT CHANGED    Details   norgestimate-ethinyl  estradiol (Sprintec 28) 0.25-35 MG-MCG per tablet Take 1 tablet by mouth daily, Starting Thu 1/18/2024, Normal      prenatal vitamin (CLASSIC FORMULA) 27-0.8 mg Take 1 tablet by mouth in the morning for 14 days, Starting Sun 2/18/2024, Until Sun 3/3/2024, Normal           Outpatient Discharge Orders   hCG, quantitative   Standing Status: Future Standing Exp. Date: 02/26/25       PDMP Review       None             ED Provider  Attending physically available and evaluated Renae Benoitrio Simental. I managed the patient along with the ED Attending.    Electronically Signed by           Tylor Arnett DO  02/26/24 1957

## 2024-02-26 NOTE — DISCHARGE INSTRUCTIONS
Follow-up with OBGYN as scheduled, also have another beta hcg (quantitative drawn). Take tylenol for cramping pain. Please return to the ED as needed for new/worsening symptoms including severe/worsening pain, fevers, vomiting, weakness, etc.

## 2024-02-27 ENCOUNTER — HOSPITAL ENCOUNTER (EMERGENCY)
Facility: HOSPITAL | Age: 19
Discharge: HOME | End: 2024-02-28
Attending: EMERGENCY MEDICINE | Admitting: EMERGENCY MEDICINE

## 2024-02-27 ENCOUNTER — APPOINTMENT (EMERGENCY)
Dept: RADIOLOGY | Facility: HOSPITAL | Age: 19
End: 2024-02-27

## 2024-02-27 VITALS
HEART RATE: 80 BPM | TEMPERATURE: 99 F | DIASTOLIC BLOOD PRESSURE: 58 MMHG | RESPIRATION RATE: 18 BRPM | SYSTOLIC BLOOD PRESSURE: 130 MMHG | OXYGEN SATURATION: 98 %

## 2024-02-27 DIAGNOSIS — N93.9 VAGINAL BLEEDING: Primary | ICD-10-CM

## 2024-02-27 LAB
ANION GAP SERPL CALCULATED.3IONS-SCNC: 7 MMOL/L
B-HCG SERPL-ACNC: 561 MIU/ML (ref 0–5)
BACTERIA UR QL AUTO: ABNORMAL /HPF
BASOPHILS # BLD AUTO: 0.03 THOUSANDS/ÂΜL (ref 0–0.1)
BASOPHILS NFR BLD AUTO: 0 % (ref 0–1)
BILIRUB UR QL STRIP: NEGATIVE
BUN SERPL-MCNC: 11 MG/DL (ref 5–25)
CALCIUM SERPL-MCNC: 9.1 MG/DL (ref 8.4–10.2)
CHLORIDE SERPL-SCNC: 105 MMOL/L (ref 96–108)
CLARITY UR: CLEAR
CO2 SERPL-SCNC: 26 MMOL/L (ref 21–32)
COLOR UR: ABNORMAL
CREAT SERPL-MCNC: 0.67 MG/DL (ref 0.6–1.3)
EOSINOPHIL # BLD AUTO: 0.08 THOUSAND/ÂΜL (ref 0–0.61)
EOSINOPHIL NFR BLD AUTO: 1 % (ref 0–6)
ERYTHROCYTE [DISTWIDTH] IN BLOOD BY AUTOMATED COUNT: 20.2 % (ref 11.6–15.1)
GFR SERPL CREATININE-BSD FRML MDRD: 126 ML/MIN/1.73SQ M
GLUCOSE SERPL-MCNC: 82 MG/DL (ref 65–140)
GLUCOSE UR STRIP-MCNC: NEGATIVE MG/DL
HCT VFR BLD AUTO: 33.7 % (ref 34.8–46.1)
HGB BLD-MCNC: 9.7 G/DL (ref 11.5–15.4)
HGB UR QL STRIP.AUTO: ABNORMAL
IMM GRANULOCYTES # BLD AUTO: 0.04 THOUSAND/UL (ref 0–0.2)
IMM GRANULOCYTES NFR BLD AUTO: 1 % (ref 0–2)
KETONES UR STRIP-MCNC: NEGATIVE MG/DL
LEUKOCYTE ESTERASE UR QL STRIP: NEGATIVE
LYMPHOCYTES # BLD AUTO: 2.53 THOUSANDS/ÂΜL (ref 0.6–4.47)
LYMPHOCYTES NFR BLD AUTO: 31 % (ref 14–44)
MCH RBC QN AUTO: 21.1 PG (ref 26.8–34.3)
MCHC RBC AUTO-ENTMCNC: 28.8 G/DL (ref 31.4–37.4)
MCV RBC AUTO: 73 FL (ref 82–98)
MONOCYTES # BLD AUTO: 0.75 THOUSAND/ÂΜL (ref 0.17–1.22)
MONOCYTES NFR BLD AUTO: 9 % (ref 4–12)
NEUTROPHILS # BLD AUTO: 4.8 THOUSANDS/ÂΜL (ref 1.85–7.62)
NEUTS SEG NFR BLD AUTO: 58 % (ref 43–75)
NITRITE UR QL STRIP: NEGATIVE
NON-SQ EPI CELLS URNS QL MICRO: ABNORMAL /HPF
NRBC BLD AUTO-RTO: 0 /100 WBCS
PH UR STRIP.AUTO: 6.5 [PH]
PLATELET # BLD AUTO: 238 THOUSANDS/UL (ref 149–390)
POTASSIUM SERPL-SCNC: 3.8 MMOL/L (ref 3.5–5.3)
PROT UR STRIP-MCNC: NEGATIVE MG/DL
RBC # BLD AUTO: 4.6 MILLION/UL (ref 3.81–5.12)
RBC #/AREA URNS AUTO: ABNORMAL /HPF
SODIUM SERPL-SCNC: 138 MMOL/L (ref 135–147)
SP GR UR STRIP.AUTO: 1.02 (ref 1–1.03)
UROBILINOGEN UR STRIP-ACNC: <2 MG/DL
WBC # BLD AUTO: 8.23 THOUSAND/UL (ref 4.31–10.16)
WBC #/AREA URNS AUTO: ABNORMAL /HPF

## 2024-02-27 PROCEDURE — 99284 EMERGENCY DEPT VISIT MOD MDM: CPT

## 2024-02-27 PROCEDURE — 81001 URINALYSIS AUTO W/SCOPE: CPT

## 2024-02-27 PROCEDURE — 80048 BASIC METABOLIC PNL TOTAL CA: CPT

## 2024-02-27 PROCEDURE — 99284 EMERGENCY DEPT VISIT MOD MDM: CPT | Performed by: EMERGENCY MEDICINE

## 2024-02-27 PROCEDURE — 87086 URINE CULTURE/COLONY COUNT: CPT

## 2024-02-27 PROCEDURE — 76815 OB US LIMITED FETUS(S): CPT

## 2024-02-27 PROCEDURE — 36415 COLL VENOUS BLD VENIPUNCTURE: CPT

## 2024-02-27 PROCEDURE — 84702 CHORIONIC GONADOTROPIN TEST: CPT

## 2024-02-27 PROCEDURE — 85025 COMPLETE CBC W/AUTO DIFF WBC: CPT

## 2024-02-27 RX ORDER — ACETAMINOPHEN 325 MG/1
650 TABLET ORAL ONCE
Status: COMPLETED | OUTPATIENT
Start: 2024-02-27 | End: 2024-02-27

## 2024-02-27 RX ADMIN — ACETAMINOPHEN 650 MG: 325 TABLET, FILM COATED ORAL at 21:47

## 2024-02-27 NOTE — Clinical Note
Renae Simental was seen and treated in our emergency department on 2/27/2024.    No restrictions            Diagnosis: Other    Renae  .    She may return on this date:          If you have any questions or concerns, please don't hesitate to call.      Benny Fritz MD    ______________________________           _______________          _______________  Hospital Representative                              Date                                Time

## 2024-02-28 NOTE — ED PROVIDER NOTES
"History  Chief Complaint   Patient presents with    Vaginal Bleeding     Pt has had recent +pregnancy tests, last menstrual cycle was Jan 17; seen at [facility] for same complaint of bleeding.  C/o increased vaginal bleeding and abdominal pain. Pt states today she passed large blood clots and pain has increased.     Patient is an otherwise healthy 17 yo female who presents with vaginal bleeding. Patient states she had a positive home pregnancy on February 16th; LMP on January 17th. Patient states bleeding started on 2/25;seen at [facility] ED for same complaint on 2/26. Was found to have bHCG quant of 718. Transvaginal US showed \"no intrauterine gestation or adnexal mass\"; recommended to follow up with OB/Gyn for serial bHCG quant. Today, still experiencing dark red vaginal bleeding and clots. Also endorsing pelvic cramping, 8/10 in severity. Patient did not take any OTC medications at home for pain. Denies chest pain, SOB, nausea, vomiting, diarrhea, constipation, dysuria, or vaginal discharge. Has not been pregnant before.         Prior to Admission Medications   Prescriptions Last Dose Informant Patient Reported? Taking?   norgestimate-ethinyl estradiol (Sprintec 28) 0.25-35 MG-MCG per tablet   No No   Sig: Take 1 tablet by mouth daily   prenatal vitamin (CLASSIC FORMULA) 27-0.8 mg   No No   Sig: Take 1 tablet by mouth in the morning for 14 days      Facility-Administered Medications: None       Past Medical History:   Diagnosis Date    No known health problems        Past Surgical History:   Procedure Laterality Date    NO PAST SURGERIES         Family History   Problem Relation Age of Onset    Breast cancer Neg Hx     Colon cancer Neg Hx     Ovarian cancer Neg Hx     Cancer Neg Hx      I have reviewed and agree with the history as documented.    E-Cigarette/Vaping    E-Cigarette Use Never User      E-Cigarette/Vaping Substances     Social History     Tobacco Use    Smoking status: Never    Smokeless tobacco: Never "   Vaping Use    Vaping status: Never Used   Substance Use Topics    Alcohol use: Yes     Comment: couple times/year    Drug use: Never        Review of Systems   Genitourinary:  Positive for vaginal bleeding.   All other systems reviewed and are negative.      Physical Exam  ED Triage Vitals   Temperature Pulse Respirations Blood Pressure SpO2   02/27/24 1949 02/27/24 1949 02/27/24 1949 02/27/24 1949 02/27/24 1949   99 °F (37.2 °C) (!) 108 18 131/71 98 %      Temp Source Heart Rate Source Patient Position - Orthostatic VS BP Location FiO2 (%)   02/27/24 1949 02/27/24 1949 02/27/24 2257 02/27/24 2257 --   Temporal Monitor Sitting Right arm       Pain Score       02/27/24 2147       8             Orthostatic Vital Signs  Vitals:    02/27/24 1949 02/27/24 2257   BP: 131/71 130/58   Pulse: (!) 108 80   Patient Position - Orthostatic VS:  Sitting       Physical Exam  Constitutional:       General: She is not in acute distress.     Appearance: Normal appearance. She is not ill-appearing, toxic-appearing or diaphoretic.   HENT:      Head: Normocephalic and atraumatic.   Cardiovascular:      Rate and Rhythm: Normal rate and regular rhythm.      Heart sounds: Normal heart sounds.   Pulmonary:      Effort: Pulmonary effort is normal.      Breath sounds: Normal breath sounds.   Chest:      Chest wall: Tenderness: suprapubic tendernness.   Abdominal:      General: Abdomen is flat.      Palpations: Abdomen is soft.      Tenderness: There is abdominal tenderness.   Musculoskeletal:         General: Normal range of motion.      Cervical back: Normal range of motion and neck supple.   Skin:     General: Skin is warm and dry.   Neurological:      General: No focal deficit present.      Mental Status: She is alert.   Psychiatric:         Mood and Affect: Mood normal.         Behavior: Behavior normal.         ED Medications  Medications   acetaminophen (TYLENOL) tablet 650 mg (650 mg Oral Given 2/27/24 2147)       Diagnostic  Studies  Results Reviewed       Procedure Component Value Units Date/Time    hCG, quantitative [[id number]]  (Abnormal) Collected: 02/27/24 2145    Lab Status: Final result Specimen: Blood from Arm, Left Updated: 02/27/24 2221     HCG, Quant 561 mIU/mL     Narrative:       Expected Ranges:    HCG results between 5 and 25 mIU/mL may be indicative of early pregnancy but should be interpreted in light of the total clinical presentation.    HCG can rise to detectable levels in antione and post menopausal women (0-11.6 mIU/mL).     Approximate               Approximate HCG  Gestation age          Concentration ( mIU/mL)  _____________          ______________________   Weeks                      HCG values  0.2-1                       5-50  1-2                           2-3                         100-5000  3-4                         500-47751  4-5                         1000-67101  5-6                         34751-812925  6-8                         13401-663272  8-12                        37780-045732      Urine Microscopic [[id number]]  (Abnormal) Collected: 02/27/24 2137    Lab Status: Final result Specimen: Urine, Other Updated: 02/27/24 2215     RBC, UA Innumerable /hpf      WBC, UA 1-2 /hpf      Epithelial Cells Occasional /hpf      Bacteria, UA None Seen /hpf      URINE COMMENT --    Basic metabolic panel [[id number]] Collected: 02/27/24 2145    Lab Status: Final result Specimen: Blood from Arm, Left Updated: 02/27/24 2213     Sodium 138 mmol/L      Potassium 3.8 mmol/L      Chloride 105 mmol/L      CO2 26 mmol/L      ANION GAP 7 mmol/L      BUN 11 mg/dL      Creatinine 0.67 mg/dL      Glucose 82 mg/dL      Calcium 9.1 mg/dL      eGFR 126 ml/min/1.73sq m     Narrative:      National Kidney Disease Foundation guidelines for Chronic Kidney Disease (CKD):     Stage 1 with normal or high GFR (GFR > 90 mL/min/1.73 square meters)    Stage 2 Mild CKD (GFR = 60-89 mL/min/1.73 square meters)    Stage 3A Moderate CKD  (GFR = 45-59 mL/min/1.73 square meters)    Stage 3B Moderate CKD (GFR = 30-44 mL/min/1.73 square meters)    Stage 4 Severe CKD (GFR = 15-29 mL/min/1.73 square meters)    Stage 5 End Stage CKD (GFR <15 mL/min/1.73 square meters)  Note: GFR calculation is accurate only with a steady state creatinine    UA w Reflex to Microscopic w Reflex to Culture [[id number]]  (Abnormal) Collected: 02/27/24 2137    Lab Status: Final result Specimen: Urine, Other Updated: 02/27/24 2210     Color, UA Light Yellow     Clarity, UA Clear     Specific Gravity, UA 1.016     pH, UA 6.5     Leukocytes, UA Negative     Nitrite, UA Negative     Protein, UA Negative mg/dl      Glucose, UA Negative mg/dl      Ketones, UA Negative mg/dl      Urobilinogen, UA <2.0 mg/dl      Bilirubin, UA Negative     Occult Blood, UA Large     URINE COMMENT --    Urine culture [[id number]] Collected: 02/27/24 2137    Lab Status: In process Specimen: Urine, Other Updated: 02/27/24 2210    CBC and differential [[id number]]  (Abnormal) Collected: 02/27/24 2145    Lab Status: Final result Specimen: Blood from Arm, Left Updated: 02/27/24 2159     WBC 8.23 Thousand/uL      RBC 4.60 Million/uL      Hemoglobin 9.7 g/dL      Hematocrit 33.7 %      MCV 73 fL      MCH 21.1 pg      MCHC 28.8 g/dL      RDW 20.2 %      Platelets 238 Thousands/uL      nRBC 0 /100 WBCs      Neutrophils Relative 58 %      Immat GRANS % 1 %      Lymphocytes Relative 31 %      Monocytes Relative 9 %      Eosinophils Relative 1 %      Basophils Relative 0 %      Neutrophils Absolute 4.80 Thousands/µL      Immature Grans Absolute 0.04 Thousand/uL      Lymphocytes Absolute 2.53 Thousands/µL      Monocytes Absolute 0.75 Thousand/µL      Eosinophils Absolute 0.08 Thousand/µL      Basophils Absolute 0.03 Thousands/µL                    US OB pregnancy limited with transvaginal   Final Result by Jaime Young MD (02/27 2313)      1.  No intrauterine gestation or adnexal mass identified.   2.  Differential  "remains early IUP, spontaneous , and ectopic pregnancy.  Correlate with serial quantitative BHCG.   3.  Thickened endometrium similar to prior examination with probable blood products within the endometrial cavity and cervix.               Workstation performed: [id number]               Procedures  POC Pelvic US    Date/Time: 2024 1:01 AM    Performed by: Lala Bustos MD  Authorized by: Lala Bustos MD    Patient location:  ED  Other Assisting Provider: Yes (comment) (Benny Fritz)    Procedure details:     Exam Type:  Diagnostic    Indications: evaluate for IUP and pregnant with vaginal bleeding      Assessment for: confirm intrauterine pregnancy      Technique:  Transabdominal obstetric (HCG+) exam    Views obtained: uterus (transverse and sagittal)      Image quality: diagnostic      Image availability:  Images available in PACS  Uterine findings:     Endometrial stripe: identified      Intrauterine pregnancy: not identified      Single gestation: not identified      Multiple gestation: not identified      Gestational sac: not identified      Yolk sac: not identified      Fetal pole: not identified      Fetal heart rate: not identified    Right adnexa findings:     Right ovary:  Not visualized  Left adnexa findings:     Left ovary:  Not visualized  Other findings:     Free pelvic fluid: not identified      Free peritoneal fluid: not identified    Interpretation:     Pregnancy findings: indeterminate          ED Course         CRAFFT      Flowsheet Row Most Recent Value   CRAFFT Initial Screen: During the past 12 months, did you:    1. Drink any alcohol (more than a few sips)?  No Filed at: 2024   2. Smoke any marijuana or hashish No Filed at: 2024   3. Use anything else to get high? (\"anything else\" includes illegal drugs, over the counter and prescription drugs, and things that you sniff or 'menard')? No Filed at: 2024      "                                 Medical Decision Making  Renae Simental is a 18 y.o. who presents with complaints of vaginal bleeding, pregnant    Vital signs are stable, afebrile  PE: suprapubic tenderness, otherwise WNL    Ddx: 1st trimester vaginal bleeding vs. Ectopic pregnancy vs. Threatened  vs. Completed     Plan: bHCG quant down trending from yesterday   Transvaginal US: no intrauterine gestation or adnexal mass identified  CBC and CMP consistent with prior  UA not concerning for UTI  Tylenol given for pain   Recommend follow up with OBGYN, referral provided    Disposition: Patient stable for discharge home. Return precautions provided. Patient understands and is agreeable to plan .       Amount and/or Complexity of Data Reviewed  Labs: ordered.  Radiology: ordered.    Risk  OTC drugs.          Disposition  Final diagnoses:   Vaginal bleeding, first trimester vaginal bleeding; likely miscarriage     Time reflects when diagnosis was documented in both MDM as applicable and the Disposition within this note       Time User Action Codes Description Comment    2024 11:37 PM Benny Fritz Add [N93.9] Vaginal bleeding     2024 11:37 PM Benny Fritz Modify [N93.9] Vaginal bleeding, first trimester vaginal bleeding; likely miscarriage           ED Disposition       ED Disposition   Discharge    Condition   Stable    Date/Time    7471    Comment   Renae Simental discharge to home/self care.                   Follow-up Information       Follow up With Specialties Details Why Contact Info Additional Information    [facility] Emergency Department Emergency Medicine Go to  If symptoms worsen [address] [id number] [phone] [facility] Emergency Department, [address] [place], [place], [address]   [phone]            Discharge Medication List as of 2024 12:23 AM         CONTINUE these medications which have NOT CHANGED    Details   norgestimate-ethinyl estradiol (Sprintec 28) 0.25-35 MG-MCG per tablet Take 1 tablet by mouth daily, Starting Thu 1/18/2024, Normal      prenatal vitamin (CLASSIC FORMULA) 27-0.8 mg Take 1 tablet by mouth in the morning for 14 days, Starting Sun 2/18/2024, Until Sun 3/3/2024, Normal               PDMP Review       None             ED Provider  Attending physically available and evaluated Renae Simental. I managed the patient along with the ED Attending.    Electronically Signed by           Lala Bustos MD  02/28/24 9627

## 2024-02-28 NOTE — DISCHARGE INSTRUCTIONS
Please follow-up with OBGYN for re-evaluation  Your HCG value was decreasing which likely means that this is a miscarriage.    As we spoke before, abotu 50% of women who are pregnant with vaginal bleeding will have vaginal bleeding. It's extremely common. There's nothing that you did specifically to cause it.     Please discuss this with your OBGYN doctor.

## 2024-02-28 NOTE — ED ATTENDING ATTESTATION
Final Diagnoses:     1. Vaginal bleeding, first trimester vaginal bleeding; likely miscarriage      ED Course as of 02/27/24 2338 Tue Feb 27, 2024 2135 POCUS OB:  - Images / study collected by myself and resident.  - Images available in PACS  - There is no obvious IUP, just an endometrial stripe  - RUQ without free fluid.    2234 HCG QUANTITATIVE(!): 561  Decreasing  That's reassuring for likely miscarriage.    2331 HCG QUANTITATIVE(!): 561       I, Benny Fritz MD, saw and evaluated the patient. All available labs and X-rays were ordered by me or the resident / non-physician and have been reviewed by myself. I discussed the patient with the resident / non-physician and agree with the resident's / non-physician practitioner's findings and plan as documented in the resident's / non-physician practicitioner's note, except where noted.   At this point, I agree with the current assessment done in the ED.   I was present during key portions of all procedures performed unless otherwise stated.     HPI:  NURSING TRIAGE:    This is a 18 y.o. female presenting for evaluation of vaginal bleeding in setting of pregnancy  Doesn't take anything for pain.  No f/ch/n/v/cp/sob. Chief Complaint   Patient presents with    Vaginal Bleeding     Pt has had recent +pregnancy tests, last menstrual cycle was Jan 17; seen at [facility] for same complaint of bleeding.  C/o increased vaginal bleeding and abdominal pain. Pt states today she passed large blood clots and pain has increased.      PHYSICAL: ASSESSMENT + PLAN:   Pertinent: suprapubpic tenderness  No rebound/guarding.     General: VS reviewed  Appears in NAD  awake, alert.   Well-nourished, well-developed. Appears stated age.   Speaking normally in full sentences.   Head: Normocephalic, atraumatic  Eyes: EOM-I. No diplopia.   No hyphema.   No subconjunctival hemorrhages.  Symmetrical lids.   ENT: Atraumatic external nose and ears.    MMM  No malocclusion. No stridor. Normal  phonation. No drooling. Normal swallowing.   Neck: No JVD.  CV: No pallor noted  Lungs:   No tachypnea  No respiratory distress  Abd: soft suprapubpic tenderness  nd no rebound/guarding  MSK:   FROM spontaneously  Skin: Dry, intact.   Neuro: Awake, alert, GCS15, CN II-XII grossly intact.   Motor grossly intact.  Psychiatric/Behavioral: interacting normally; appropriate mood/affect.    Exam: deferred    Vitals:    02/27/24 1949 02/27/24 2257   BP: 131/71 130/58   BP Location:  Right arm   Pulse: (!) 108 80   Resp: 18    Temp: 99 °F (37.2 °C)    TempSrc: Temporal    SpO2: 98% 98%    A:  - First trimester vaginal bleeding  P:  - As patient has no confirmed IUP and is pregnant given the urine pregnancy test, will do beta-HCG.  - Will do U/S  - Attempt POCUS here   - Reviewed with patient that first trimester bleeding occurs in 20% of women. Reviewed that of these 20%, 50% will miscarry, 50% will not miscarry.   - Lastly, reviewed with patient that if she has an IUP with a FHR, this drops the 50% miscarriage rate to only 1%.   - Review of EPIC --> The patient has a documented Rh documented for possible RhoGam  0   Lab Value Date/Time    RH Positive 02/17/2024 2318   - If no obvious pregnancy on U/S, will discuss 48-hour beta-HCG testing for trending.        There are no obvious limitations to social determinants of care.   Nursing note reviewed.   Vitals reviewed.   Orders placed by myself and/or advanced practitioner / resident.    Previous chart was reviewed  No language barrier.   History obtained from patient.    There are no limitations to the history obtained:     Past Medical: Past Surgical:    has a past medical history of No known health problems.  has a past surgical history that includes No past surgeries.   Social: Cardiac (Echo/Cath)   Social History     Substance and Sexual Activity   Alcohol Use Yes    Comment: couple times/year     Social History     Tobacco Use   Smoking Status Never   Smokeless  Tobacco Never     Social History     Substance and Sexual Activity   Drug Use Never    No results found for this or any previous visit.    No results found for this or any previous visit.    No results found for this or any previous visit.     Labs: Imaging:   Labs Reviewed   HCG, QUANTITATIVE - Abnormal       Result Value Ref Range Status    HCG, Quant 561 (*) 0 - 5 mIU/mL Final    Narrative:      Expected Ranges:    HCG results between 5 and 25 mIU/mL may be indicative of early pregnancy but should be interpreted in light of the total clinical presentation.    HCG can rise to detectable levels in antione and post menopausal women (0-11.6 mIU/mL).     Approximate               Approximate HCG  Gestation age          Concentration ( mIU/mL)  _____________          ______________________   Weeks                      HCG values  0.2-1                       5-50  1-2                           2-3                         100-5000  3-4                         500-62726  4-5                         1000-30757  5-6                         [id number]  6-8                         [id number]  8-12                        [id number]     UA W REFLEX TO MICROSCOPIC WITH REFLEX TO CULTURE - Abnormal    Color, UA Light Yellow   Final    Clarity, UA Clear   Final    Specific Gravity, UA 1.016  1.003 - 1.030 Final    pH, UA 6.5  4.5, 5.0, 5.5, 6.0, 6.5, 7.0, 7.5, 8.0 Final    Leukocytes, UA Negative  Negative Final    Nitrite, UA Negative  Negative Final    Protein, UA Negative  Negative mg/dl Final    Glucose, UA Negative  Negative mg/dl Final    Ketones, UA Negative  Negative mg/dl Final    Urobilinogen, UA <2.0  <2.0 mg/dl mg/dl Final    Bilirubin, UA Negative  Negative Final    Occult Blood, UA Large (*) Negative Final    URINE COMMENT     Final    Comment: Pt is pregnant. Urine Culture ordered.   CBC AND DIFFERENTIAL - Abnormal    WBC 8.23  4.31 - 10.16 Thousand/uL Final    RBC 4.60  3.81 - 5.12 Million/uL Final     Hemoglobin 9.7 (*) 11.5 - 15.4 g/dL Final    Hematocrit 33.7 (*) 34.8 - 46.1 % Final    MCV 73 (*) 82 - 98 fL Final    MCH 21.1 (*) 26.8 - 34.3 pg Final    MCHC 28.8 (*) 31.4 - 37.4 g/dL Final    RDW 20.2 (*) 11.6 - 15.1 % Final    Platelets 238  149 - 390 Thousands/uL Final    nRBC 0  /100 WBCs Final    Neutrophils Relative 58  43 - 75 % Final    Immat GRANS % 1  0 - 2 % Final    Lymphocytes Relative 31  14 - 44 % Final    Monocytes Relative 9  4 - 12 % Final    Eosinophils Relative 1  0 - 6 % Final    Basophils Relative 0  0 - 1 % Final    Neutrophils Absolute 4.80  1.85 - 7.62 Thousands/µL Final    Immature Grans Absolute 0.04  0.00 - 0.20 Thousand/uL Final    Lymphocytes Absolute 2.53  0.60 - 4.47 Thousands/µL Final    Monocytes Absolute 0.75  0.17 - 1.22 Thousand/µL Final    Eosinophils Absolute 0.08  0.00 - 0.61 Thousand/µL Final    Basophils Absolute 0.03  0.00 - 0.10 Thousands/µL Final   URINE MICROSCOPIC - Abnormal    RBC, UA Innumerable (*) None Seen, 1-2 /hpf Final    WBC, UA 1-2  None Seen, 1-2 /hpf Final    Epithelial Cells Occasional  None Seen, Occasional /hpf Final    Bacteria, UA None Seen  None Seen, Occasional /hpf Final    URINE COMMENT     Final    Comment: Pt is pregnant. Urine Culture ordered.   URINE CULTURE   BASIC METABOLIC PANEL    Sodium 138  135 - 147 mmol/L Final    Potassium 3.8  3.5 - 5.3 mmol/L Final    Chloride 105  96 - 108 mmol/L Final    CO2 26  21 - 32 mmol/L Final    ANION GAP 7  mmol/L Final    BUN 11  5 - 25 mg/dL Final    Creatinine 0.67  0.60 - 1.30 mg/dL Final    Comment: Standardized to IDMS reference method    Glucose 82  65 - 140 mg/dL Final    Comment: If the patient is fasting, the ADA then defines impaired fasting glucose as > 100 mg/dL and diabetes as > or equal to 123 mg/dL.    Calcium 9.1  8.4 - 10.2 mg/dL Final    eGFR 126  ml/min/1.73sq m Final    Narrative:     National Kidney Disease Foundation guidelines for Chronic Kidney Disease (CKD):     Stage 1 with  normal or high GFR (GFR > 90 mL/min/1.73 square meters)    Stage 2 Mild CKD (GFR = 60-89 mL/min/1.73 square meters)    Stage 3A Moderate CKD (GFR = 45-59 mL/min/1.73 square meters)    Stage 3B Moderate CKD (GFR = 30-44 mL/min/1.73 square meters)    Stage 4 Severe CKD (GFR = 15-29 mL/min/1.73 square meters)    Stage 5 End Stage CKD (GFR <15 mL/min/1.73 square meters)  Note: GFR calculation is accurate only with a steady state creatinine    US OB pregnancy limited with transvaginal   Final Result      1.  No intrauterine gestation or adnexal mass identified.   2.  Differential remains early IUP, spontaneous , and ectopic pregnancy.  Correlate with serial quantitative BHCG.   3.  Thickened endometrium similar to prior examination with probable blood products within the endometrial cavity and cervix.               Workstation performed: [id number]            Medications: Code Status:   Medications   acetaminophen (TYLENOL) tablet 650 mg (650 mg Oral Given 24)    Code Status: No Order  Advance Directive and Living Will:      Power of :    POLST:       Orders Placed This Encounter   Procedures    Urine culture    US OB pregnancy limited with transvaginal    hCG, quantitative    UA w Reflex to Microscopic w Reflex to Culture    CBC and differential    Basic metabolic panel    Urine Microscopic     Time reflects when diagnosis was documented in both MDM as applicable and the Disposition within this note       Time User Action Codes Description Comment    2024 11:37 PM Benny Fritz Add [N93.9] Vaginal bleeding     2024 11:37 PM Benny Fritz Modify [N93.9] Vaginal bleeding, first trimester vaginal bleeding; likely miscarriage           ED Disposition       ED Disposition   Discharge    Condition   Stable    Date/Time    11:38 PM    Comment   Renae Simental discharge to home/self care.                   Follow-up Information       Follow up With  "Specialties Details Why Contact Info Additional Information    [facility] Emergency Department Emergency Medicine Go to  If symptoms worsen [address] [id number] [phone] [facility] Emergency Department, [address] [place], [place], [id number] [phone]          Patient's Medications   Discharge Prescriptions    No medications on file     No discharge procedures on file.  Prior to Admission Medications   Prescriptions Last Dose Informant Patient Reported? Taking?   norgestimate-ethinyl estradiol (Sprintec 28) 0.25-35 MG-MCG per tablet   No No   Sig: Take 1 tablet by mouth daily   prenatal vitamin (CLASSIC FORMULA) 27-0.8 mg   No No   Sig: Take 1 tablet by mouth in the morning for 14 days      Facility-Administered Medications: None                        Portions of the record may have been created with voice recognition software. Occasional wrong word or \"sound a like\" substitutions may have occurred due to the inherent limitations of voice recognition software. Read the chart carefully and recognize, using context, where substitutions have occurred.    Electronically signed by:  Benny Fritz    "

## 2024-02-29 LAB — BACTERIA UR CULT: NORMAL

## 2024-03-06 ENCOUNTER — INITIAL PRENATAL (OUTPATIENT)
Dept: OBGYN CLINIC | Facility: CLINIC | Age: 19
End: 2024-03-06

## 2024-03-06 VITALS — SYSTOLIC BLOOD PRESSURE: 104 MMHG | DIASTOLIC BLOOD PRESSURE: 66 MMHG | WEIGHT: 236.2 LBS

## 2024-03-06 DIAGNOSIS — N93.9 VAGINAL BLEEDING: Primary | ICD-10-CM

## 2024-03-06 PROCEDURE — 99203 OFFICE O/P NEW LOW 30 MIN: CPT | Performed by: PHYSICIAN ASSISTANT

## 2024-04-25 ENCOUNTER — TELEPHONE (OUTPATIENT)
Dept: OBGYN CLINIC | Facility: CLINIC | Age: 19
End: 2024-04-25

## 2025-03-07 ENCOUNTER — HOSPITAL ENCOUNTER (EMERGENCY)
Facility: HOSPITAL | Age: 20
Discharge: HOME | End: 2025-03-08
Attending: EMERGENCY MEDICINE

## 2025-03-07 DIAGNOSIS — R60.0 LOCALIZED EDEMA: ICD-10-CM

## 2025-03-07 DIAGNOSIS — L29.9 PRURITUS: ICD-10-CM

## 2025-03-07 DIAGNOSIS — R21 RASH: Primary | ICD-10-CM

## 2025-03-07 PROCEDURE — 99282 EMERGENCY DEPT VISIT SF MDM: CPT

## 2025-03-07 PROCEDURE — 99284 EMERGENCY DEPT VISIT MOD MDM: CPT | Performed by: EMERGENCY MEDICINE

## 2025-03-08 VITALS
SYSTOLIC BLOOD PRESSURE: 115 MMHG | DIASTOLIC BLOOD PRESSURE: 61 MMHG | HEART RATE: 78 BPM | RESPIRATION RATE: 16 BRPM | TEMPERATURE: 98.6 F | OXYGEN SATURATION: 100 %

## 2025-03-08 DIAGNOSIS — R21 RASH: ICD-10-CM

## 2025-03-08 RX ORDER — CLOTRIMAZOLE 1 %
CREAM (GRAM) TOPICAL
Qty: 15 G | Refills: 0 | Status: SHIPPED | OUTPATIENT
Start: 2025-03-08

## 2025-03-08 RX ORDER — CLOTRIMAZOLE 1 %
CREAM (GRAM) TOPICAL 2 TIMES DAILY
Status: DISCONTINUED | OUTPATIENT
Start: 2025-03-08 | End: 2025-03-08 | Stop reason: HOSPADM

## 2025-03-08 RX ORDER — CEPHALEXIN 500 MG/1
500 CAPSULE ORAL EVERY 6 HOURS SCHEDULED
Qty: 28 CAPSULE | Refills: 0 | Status: SHIPPED | OUTPATIENT
Start: 2025-03-08 | End: 2025-03-15

## 2025-03-08 RX ORDER — CLOTRIMAZOLE 1 %
CREAM (GRAM) TOPICAL
Qty: 15 G | Refills: 0 | Status: CANCELLED | OUTPATIENT
Start: 2025-03-08

## 2025-03-08 RX ORDER — CEPHALEXIN 500 MG/1
500 CAPSULE ORAL EVERY 6 HOURS SCHEDULED
Qty: 28 CAPSULE | Refills: 0 | Status: CANCELLED | OUTPATIENT
Start: 2025-03-08 | End: 2025-03-15

## 2025-03-08 RX ADMIN — CEPHALEXIN 500 MG: 250 CAPSULE ORAL at 00:26

## 2025-03-08 RX ADMIN — CLOTRIMAZOLE 1 APPLICATION: 1 CREAM TOPICAL at 00:25

## 2025-03-08 NOTE — DISCHARGE INSTRUCTIONS
Please take your antibiotic Keflex 1 tablet every 6 hours for the next 7 days    Please apply Lotrimin cream 2 times daily for the next 7 to 10 days    You can use Benadryl cream over the areas for itchiness    Please call dermatology to schedule an appointment

## 2025-03-08 NOTE — ED ATTENDING ATTESTATION
3/7/2025  I, Reece Travis MD, saw and evaluated the patient. I have discussed the patient with the resident/non-physician practitioner and agree with the resident's/non-physician practitioner's findings, Plan of Care, and MDM as documented in the resident's/non-physician practitioner's note, except where noted. All available labs and Radiology studies were reviewed.  I was present for key portions of any procedure(s) performed by the resident/non-physician practitioner and I was immediately available to provide assistance.       At this point I agree with the current assessment done in the Emergency Department.  I have conducted an independent evaluation of this patient a history and physical is as follows:    19-year-old female presented for evaluation of almost a week of a pruritic rash beginning on her left forearm with some spreading to the right face.  She denies any particular exposures.  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  She does states she also recently started using a public gym.  No fevers, chills.  No shortness of breath, facial or oral swelling.    On exam she has a confluent papular eruption nearly circumferentially on the left forearm with numerous satellite lesions.  He also has a few scattered lesions on the right forearm as well as concentrated on the right shin.  Appears to be a folliculitis but also with numerous satellite lesions may be fungal in origin.  Will be treating with oral antibiotic as well as a topical antifungal agent.  Recommending returning if she has worsening symptoms.  Will recommend follow-up with dermatology for any ongoing symptoms.      ED Course         Critical Care Time  Procedures

## 2025-03-10 NOTE — ED PROVIDER NOTES
Time reflects when diagnosis was documented in both MDM as applicable and the Disposition within this note       Time User Action Codes Description Comment    3/8/2025 12:23 AM Ross Nina Add [R21] Rash     3/8/2025 12:23 AM Ross Nina Add [R60.0] Localized edema     3/8/2025 12:26 AM Ross Nina [L29.9] Pruritus           ED Disposition       ED Disposition   Discharge    Condition   Stable    Date/Time   Sat Mar 8, 2025 12:23 AM    Comment   Renae Simental discharge to home/self care.                   Assessment & Plan       Medical Decision Making  19-year-old female presents with left upper extremity rash  At risk for shingles, herpes, viral exanthem, contact dermatitis, erysipelas, impetigo, folliculitis, fungal rash, cellulitis, etc.  Physical exam is not consistent with shingles or herpes virus  Left upper extremity is too edematous and rash does not appear viral in nature  Patient denies any contact or new products in that region with no medications show contact or allergic dermatitis is unlikely  Rash appears most similar to bacterial/fungal rash  Will start patient on Keflex 500 mg to cover for folliculitis/cellulitis/erysipelas  Will also give patient clotrimazole to cover possible fungal etiology  Picture of rash placed in patient's chart  Recommended follow-up with dermatologist for further evaluation of rash  Directions given on taking medications, Benadryl/topical Benadryl  Strict return precautions given  Patient agreeable to plan, has no further questions at this time, in stable condition and cleared for discharge    Risk  OTC drugs.  Prescription drug management.             Medications   cephalexin (KEFLEX) capsule 500 mg (500 mg Oral Given 3/8/25 0026)       ED Risk Strat Scores                            SBIRT 20yo+      Flowsheet Row Most Recent Value   Initial Alcohol Screen: US AUDIT-C     1. How often do you have a drink containing alcohol? 0 Filed at: 03/07/2025 2155   2.  How many drinks containing alcohol do you have on a typical day you are drinking?  0 Filed at: 03/07/2025 2332   3b. FEMALE Any Age, or MALE 65+: How often do you have 4 or more drinks on one occassion? 0 Filed at: 03/07/2025 2332   Audit-C Score 0 Filed at: 03/07/2025 2332   KATLYN: How many times in the past year have you...    Used an illegal drug or used a prescription medication for non-medical reasons? Never Filed at: 03/07/2025 2332                            History of Present Illness       Chief Complaint   Patient presents with    Rash     Pt c/o rash on L forearm and R side of chin that started 5 days ago and is getting worse. Pt reports no recent changes to detergents or soap. Pt reports that it is itching and burning. Denies any swelling or itching of lips, throat, or tongue. Denies any eczema dx.         Past Medical History:   Diagnosis Date    No known health problems       Past Surgical History:   Procedure Laterality Date    NO PAST SURGERIES        Family History   Problem Relation Age of Onset    Breast cancer Neg Hx     Colon cancer Neg Hx     Ovarian cancer Neg Hx     Cancer Neg Hx       Social History     Tobacco Use    Smoking status: Never    Smokeless tobacco: Never   Vaping Use    Vaping status: Never Used   Substance Use Topics    Alcohol use: Not Currently    Drug use: Never      E-Cigarette/Vaping    E-Cigarette Use Never User       E-Cigarette/Vaping Substances      I have reviewed and agree with the history as documented.     19-year-old female presents with rash over left forearm and right shin that began 5 days ago.  Patient states that rash initially began a small area and has now circumferential around her left arm with associated edema.  Patient states that rash is significantly worsened.  Patient states rash began as small red bumps and has spread to a large area of erythema with vesicles.  Patient denies using any new products, sick contacts, excessive time in hot tubs/steam  rooms/saunas, allergies, new medications, or any other changes to her daily activities.  Patient states rash is itchy but has no associated fevers, chills, nausea, vomiting.      Rash  Associated symptoms: no abdominal pain, no fever, no joint pain, no shortness of breath, no sore throat and not vomiting        Review of Systems   Constitutional:  Negative for chills and fever.   HENT:  Negative for ear pain and sore throat.    Eyes:  Negative for pain and visual disturbance.   Respiratory:  Negative for cough and shortness of breath.    Cardiovascular:  Negative for chest pain and palpitations.   Gastrointestinal:  Negative for abdominal pain and vomiting.   Genitourinary:  Negative for dysuria and hematuria.   Musculoskeletal:  Negative for arthralgias and back pain.   Skin:  Positive for rash. Negative for color change.   Neurological:  Negative for seizures and syncope.   All other systems reviewed and are negative.          Objective       ED Triage Vitals   Temperature Pulse Blood Pressure Respirations SpO2 Patient Position - Orthostatic VS   03/07/25 2330 03/07/25 2329 03/07/25 2329 03/07/25 2329 03/07/25 2329 03/07/25 2329   98.6 °F (37 °C) 73 122/63 16 100 % Sitting      Temp Source Heart Rate Source BP Location FiO2 (%) Pain Score    03/07/25 2330 03/08/25 0000 03/07/25 2329 -- --    Oral Monitor Right arm        Vitals      Date and Time Temp Pulse SpO2 Resp BP Pain Score FACES Pain Rating User   03/08/25 0000 -- 78 100 % 16 115/61 -- -- CG   03/07/25 2330 98.6 °F (37 °C) -- -- -- -- -- -- CG   03/07/25 2329 -- 73 100 % 16 122/63 -- -- CG            Physical Exam  Vitals and nursing note reviewed.   Constitutional:       General: She is not in acute distress.     Appearance: She is well-developed. She is obese. She is not ill-appearing or toxic-appearing.   HENT:      Head: Normocephalic and atraumatic.      Nose: Nose normal. No congestion.   Eyes:      General: No scleral icterus.        Right eye: No  discharge.         Left eye: No discharge.      Conjunctiva/sclera: Conjunctivae normal.   Cardiovascular:      Rate and Rhythm: Normal rate and regular rhythm.      Heart sounds: No murmur heard.  Pulmonary:      Effort: Pulmonary effort is normal. No respiratory distress.      Breath sounds: Normal breath sounds. No stridor. No wheezing, rhonchi or rales.   Abdominal:      General: There is no distension.      Palpations: Abdomen is soft.      Tenderness: There is no abdominal tenderness. There is no guarding.   Musculoskeletal:         General: Swelling present. No tenderness or signs of injury.      Cervical back: Neck supple.      Right lower leg: No edema.      Left lower leg: No edema.   Skin:     General: Skin is warm and dry.      Capillary Refill: Capillary refill takes less than 2 seconds.      Coloration: Skin is not jaundiced.      Findings: Erythema and rash present. No bruising.             Comments: Vesicular rash with erythematous base noted over distal left upper extremity and right submental region of the head.  Left upper extremity has associated edema with minor excoriations.  Picture of rash placed in chart.   Neurological:      Mental Status: She is alert.   Psychiatric:         Mood and Affect: Mood normal.         Results Reviewed       None            No orders to display       Procedures    ED Medication and Procedure Management   Prior to Admission Medications   Prescriptions Last Dose Informant Patient Reported? Taking?   norgestimate-ethinyl estradiol (Sprintec 28) 0.25-35 MG-MCG per tablet   No No   Sig: Take 1 tablet by mouth daily   Patient not taking: Reported on 3/6/2024   prenatal vitamin (CLASSIC FORMULA) 27-0.8 mg   No No   Sig: Take 1 tablet by mouth in the morning for 14 days      Facility-Administered Medications: None     Discharge Medication List as of 3/8/2025 12:32 AM        START taking these medications    Details   cephalexin (KEFLEX) 500 mg capsule Take 1 capsule (500  mg total) by mouth every 6 (six) hours for 7 days, Starting Sat 3/8/2025, Until Sat 3/15/2025, Normal      clotrimazole (LOTRIMIN) 1 % cream Apply to affected area 2 times daily, Normal      diphenhydrAMINE (BENADRYL) 2 % cream Apply topically 3 (three) times a day as needed for itching, Starting Sat 3/8/2025, Normal           CONTINUE these medications which have NOT CHANGED    Details   norgestimate-ethinyl estradiol (Sprintec 28) 0.25-35 MG-MCG per tablet Take 1 tablet by mouth daily, Starting Thu 1/18/2024, Normal      prenatal vitamin (CLASSIC FORMULA) 27-0.8 mg Take 1 tablet by mouth in the morning for 14 days, Starting Sun 2/18/2024, Until Sun 3/3/2024, Normal           No discharge procedures on file.  ED SEPSIS DOCUMENTATION   Time reflects when diagnosis was documented in both MDM as applicable and the Disposition within this note       Time User Action Codes Description Comment    3/8/2025 12:23 AM Ross Nina [R21] Rash     3/8/2025 12:23 AM Ross Nina [R60.0] Localized edema     3/8/2025 12:26 AM Ross Nina [L29.9] Pruritus                  Ross Nina DO  03/10/25 0821

## 2025-03-15 ENCOUNTER — HOSPITAL ENCOUNTER (EMERGENCY)
Facility: HOSPITAL | Age: 20
Discharge: HOME | End: 2025-03-15
Attending: EMERGENCY MEDICINE

## 2025-03-15 VITALS
TEMPERATURE: 97.8 F | OXYGEN SATURATION: 100 % | SYSTOLIC BLOOD PRESSURE: 132 MMHG | HEART RATE: 78 BPM | DIASTOLIC BLOOD PRESSURE: 62 MMHG | WEIGHT: 239.2 LBS | RESPIRATION RATE: 18 BRPM

## 2025-03-15 DIAGNOSIS — R21 RASH AND NONSPECIFIC SKIN ERUPTION: Primary | ICD-10-CM

## 2025-03-15 PROCEDURE — 99284 EMERGENCY DEPT VISIT MOD MDM: CPT

## 2025-03-15 PROCEDURE — 99282 EMERGENCY DEPT VISIT SF MDM: CPT

## 2025-03-15 RX ORDER — PREDNISONE 20 MG/1
40 TABLET ORAL DAILY
Qty: 8 TABLET | Refills: 0 | Status: SHIPPED | OUTPATIENT
Start: 2025-03-15 | End: 2025-03-19

## 2025-03-15 RX ORDER — PREDNISONE 20 MG/1
40 TABLET ORAL ONCE
Status: COMPLETED | OUTPATIENT
Start: 2025-03-15 | End: 2025-03-15

## 2025-03-15 RX ADMIN — PREDNISONE 40 MG: 20 TABLET ORAL at 22:23

## 2025-03-16 NOTE — DISCHARGE INSTRUCTIONS
Follow-up with PCP and dermatology.  Return to ED if symptoms worsen, fail to improve, or develop as listed in follow-up section.  Symptomatic care as discussed.  Take full course of steroids as prescribed.  Stay away from potential triggers.

## 2025-03-16 NOTE — ED PROVIDER NOTES
"Time reflects when diagnosis was documented in both MDM as applicable and the Disposition within this note       Time User Action Codes Description Comment    3/15/2025 10:07 PM Alethea Abraham Add [R21] Rash and nonspecific skin eruption           ED Disposition       ED Disposition   Discharge    Condition   Stable    Date/Time   Sat Mar 15, 2025 10:06 PM    Comment   Renae Simental discharge to home/self care.                   Assessment & Plan       Medical Decision Making  This patient who presents with rash for approximately 2 weeks, consistent with contact dermatitis. History and exam findings not consistent with dangerous etiologies of rash such as SJS/TEN, or secondary dangerous causes such as petechial rashes from thrombocytopenia or rickettsial infections. Rash does not appear urticarial with no signs of anaphylaxis either.  Findings not consistent with cellulitis or other infectious cause.  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  Plan at this time is to treat with prednisone.  First dose given in ED, remainder sent to pharmacy.  Instructed patient to follow-up with PCP and dermatology as needed.    Discussed case with Dr. Muniz. Discussed findings from the visit with the patient.  We had a conversation regarding supportive care and indications for return.  Recommended appropriate follow-up.  Patient and/or family understand and agree with plan.    Portions of the record may have been created with voice recognition software. Occasional use of the incorrect word or \"sound a like\" substitutions may have occurred due to the inherent limitations of voice recognition software. Read the chart carefully and recognize, using context, where substitutions have occurred.       Risk  Prescription drug management.             Medications   predniSONE tablet 40 mg (40 mg Oral Given 3/15/25 2223)       ED Risk Strat " Scores                                                History of Present Illness       Chief Complaint   Patient presents with    Rash     Itchy rash starting on left arm about a week ago. Was seen in ER and given medication but states not working. States rash is spreading to body and face. Denies new soaps, detergent, foods, or medication       Past Medical History:   Diagnosis Date    No known health problems       Past Surgical History:   Procedure Laterality Date    NO PAST SURGERIES        Family History   Problem Relation Age of Onset    Breast cancer Neg Hx     Colon cancer Neg Hx     Ovarian cancer Neg Hx     Cancer Neg Hx       Social History     Tobacco Use    Smoking status: Never    Smokeless tobacco: Never   Vaping Use    Vaping status: Never Used   Substance Use Topics    Alcohol use: Not Currently    Drug use: Never      E-Cigarette/Vaping    E-Cigarette Use Never User       E-Cigarette/Vaping Substances      I have reviewed and agree with the history as documented.     Patient is a 19-year-old female presenting to the ED for evaluation of rash starting on Fab 3/02/25.  States that rash began as a small area on her left wrist, progressed to circumferential around her arm with associated edema so she went to the ED for evaluation of the rash on 03/07/25.  Per providers note, patient states rash began as small red bumps and has spread to large area of erythema with vesicles, patient denied using any new products, sick contacts, excessive time in hot tub/steam room/sinus, allergies, new medications, or any other changes to her daily activities.  Patient denies same today.  Also denies any recent travel, time in woods, insect bites.  Patient was treated for possible bacterial and fungal cause of rash, with Keflex, clotrimazole cream, and diphenhydramine cream.  Patient states she has been using medications as prescribed, however, states yesterday rash progressively worsened.  States rash now spreading  up left arm, on the right forearm, chest, bilateral lateral thighs, back and face.  Describes rash as itchy and painful.  Patient unable to characterize pain.  Denies any vision changes or oral involvement.  Denies any vaginal rashes, pruritus, discharge, dysuria, hematuria, frequency, urgency.  Patient denies fever, chills, sweats, nausea/vomiting, numbness/tingling, weakness, difficulty swallowing, difficulty breathing, etc.    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  States however she wears PPE.      Rash  Associated symptoms: no abdominal pain, no diarrhea, no fever, no headaches, no joint pain, no myalgias, no nausea, no shortness of breath, no sore throat and not vomiting        Review of Systems   Constitutional:  Negative for chills and fever.   HENT:  Negative for congestion, ear pain, rhinorrhea, sore throat, trouble swallowing and voice change.    Eyes:  Negative for pain and visual disturbance.   Respiratory:  Negative for cough and shortness of breath.    Cardiovascular:  Negative for chest pain and palpitations.   Gastrointestinal:  Negative for abdominal pain, blood in stool, constipation, diarrhea, nausea and vomiting.   Genitourinary:  Negative for dysuria, flank pain, frequency, hematuria and urgency.   Musculoskeletal:  Negative for arthralgias, back pain, myalgias, neck pain and neck stiffness.   Skin:  Positive for rash. Negative for color change.   Neurological:  Negative for dizziness, seizures, syncope, weakness, light-headedness, numbness and headaches.   All other systems reviewed and are negative.          Objective       ED Triage Vitals   Temperature Pulse Blood Pressure Respirations SpO2 Patient Position - Orthostatic VS   03/15/25 2036 03/15/25 2036 03/15/25 2037 03/15/25 2036 03/15/25 2036 03/15/25 2036   97.8 °F (36.6 °C) 78 132/62 18 100 % Sitting      Temp Source Heart Rate Source BP Location FiO2 (%)  Pain Score    03/15/25 2036 03/15/25 2036 03/15/25 2036 -- --    Oral Monitor Right arm        Vitals      Date and Time Temp Pulse SpO2 Resp BP Pain Score FACES Pain Rating User   03/15/25 2037 -- -- -- -- 132/62 -- -- JR   03/15/25 2036 97.8 °F (36.6 °C) 78 100 % 18 -- -- -- JR            Physical Exam  Vitals and nursing note reviewed.   Constitutional:       General: She is not in acute distress.     Appearance: She is well-developed. She is not ill-appearing, toxic-appearing or diaphoretic.   HENT:      Head: Normocephalic and atraumatic.      Right Ear: Tympanic membrane, ear canal and external ear normal.      Left Ear: Tympanic membrane, ear canal and external ear normal.      Nose: Nose normal. No congestion or rhinorrhea.      Mouth/Throat:      Mouth: Mucous membranes are moist.      Pharynx: No oropharyngeal exudate or posterior oropharyngeal erythema.   Eyes:      General: No scleral icterus.        Right eye: No discharge.         Left eye: No discharge.      Extraocular Movements: Extraocular movements intact.      Conjunctiva/sclera: Conjunctivae normal.      Pupils: Pupils are equal, round, and reactive to light.   Cardiovascular:      Rate and Rhythm: Normal rate and regular rhythm.      Pulses: Normal pulses.      Heart sounds: Normal heart sounds. No murmur heard.  Pulmonary:      Effort: Pulmonary effort is normal. No respiratory distress.      Breath sounds: Normal breath sounds. No stridor. No wheezing, rhonchi or rales.   Chest:      Chest wall: No tenderness.   Abdominal:      Palpations: Abdomen is soft.      Tenderness: There is no abdominal tenderness. There is no right CVA tenderness, left CVA tenderness, guarding or rebound.   Musculoskeletal:         General: No swelling or tenderness. Normal range of motion.      Cervical back: Normal range of motion and neck supple. No rigidity or tenderness.   Skin:     General: Skin is warm and dry.      Capillary Refill: Capillary refill takes  less than 2 seconds.      Findings: Rash present.      Comments: Erythematous, papular, dry rash located on bilateral forearms, chest, back, face, and thighs.  As seen in pictures below.   No signs of infection. Rash is non-tender. No sloughing.   No oral or conjunctival involvement.  Findings most consistent with contact dermatitis.   Neurological:      General: No focal deficit present.      Mental Status: She is alert and oriented to person, place, and time.      Sensory: No sensory deficit.      Motor: No weakness.   Psychiatric:         Mood and Affect: Mood normal.                               Results Reviewed       None            No orders to display       Procedures    ED Medication and Procedure Management   Prior to Admission Medications   Prescriptions Last Dose Informant Patient Reported? Taking?   cephalexin (KEFLEX) 500 mg capsule   No No   Sig: Take 1 capsule (500 mg total) by mouth every 6 (six) hours for 7 days   clotrimazole (LOTRIMIN) 1 % cream   No No   Sig: Apply to affected area 2 times daily   diphenhydrAMINE (BENADRYL) 2 % cream   No No   Sig: Apply topically 3 (three) times a day as needed for itching   norgestimate-ethinyl estradiol (Sprintec 28) 0.25-35 MG-MCG per tablet   No No   Sig: Take 1 tablet by mouth daily   Patient not taking: Reported on 3/6/2024   prenatal vitamin (CLASSIC FORMULA) 27-0.8 mg   No No   Sig: Take 1 tablet by mouth in the morning for 14 days      Facility-Administered Medications: None     Discharge Medication List as of 3/15/2025 10:10 PM        START taking these medications    Details   predniSONE 20 mg tablet Take 2 tablets (40 mg total) by mouth daily for 4 days First dose given in ED 3/15/25, Starting Sat 3/15/2025, Until Wed 3/19/2025, Normal           CONTINUE these medications which have NOT CHANGED    Details   cephalexin (KEFLEX) 500 mg capsule Take 1 capsule (500 mg total) by mouth every 6 (six) hours for 7 days, Starting Sat 3/8/2025, Until Sat  3/15/2025, Normal      clotrimazole (LOTRIMIN) 1 % cream Apply to affected area 2 times daily, Normal      diphenhydrAMINE (BENADRYL) 2 % cream Apply topically 3 (three) times a day as needed for itching, Starting Sat 3/8/2025, Normal      norgestimate-ethinyl estradiol (Sprintec 28) 0.25-35 MG-MCG per tablet Take 1 tablet by mouth daily, Starting Thu 1/18/2024, Normal      prenatal vitamin (CLASSIC FORMULA) 27-0.8 mg Take 1 tablet by mouth in the morning for 14 days, Starting Sun 2/18/2024, Until Sun 3/3/2024, Normal             ED SEPSIS DOCUMENTATION   Time reflects when diagnosis was documented in both MDM as applicable and the Disposition within this note       Time User Action Codes Description Comment    3/15/2025 10:07 PM Alethea Abraham Add [R21] Rash and nonspecific skin eruption                  Alethea Abraham PA-C  03/16/25 5040